# Patient Record
Sex: FEMALE | URBAN - METROPOLITAN AREA
[De-identification: names, ages, dates, MRNs, and addresses within clinical notes are randomized per-mention and may not be internally consistent; named-entity substitution may affect disease eponyms.]

---

## 2020-06-05 ENCOUNTER — TELEPHONE ENCOUNTER (OUTPATIENT)
Dept: URBAN - METROPOLITAN AREA CLINIC 92 | Facility: CLINIC | Age: 55
End: 2020-06-05

## 2020-07-07 ENCOUNTER — TELEPHONE ENCOUNTER (OUTPATIENT)
Dept: URBAN - METROPOLITAN AREA CLINIC 92 | Facility: CLINIC | Age: 55
End: 2020-07-07

## 2020-08-03 ENCOUNTER — OFFICE VISIT (OUTPATIENT)
Dept: URBAN - METROPOLITAN AREA MEDICAL CENTER 28 | Facility: MEDICAL CENTER | Age: 55
End: 2020-08-03
Payer: COMMERCIAL

## 2020-08-03 ENCOUNTER — LAB OUTSIDE AN ENCOUNTER (OUTPATIENT)
Dept: URBAN - METROPOLITAN AREA CLINIC 92 | Facility: CLINIC | Age: 55
End: 2020-08-03

## 2020-08-03 DIAGNOSIS — D13.2 ADENOMA OF DUODENUM: ICD-10-CM

## 2020-08-03 DIAGNOSIS — K31.89 ACQUIRED DEFORMITY OF PYLORUS: ICD-10-CM

## 2020-08-03 LAB
ABSOLUTE NRBC COUNT: 0
HCT: 38.4
HGB: 12
MCH: 28.4
MCHC: 31.3
MCV: 91
MPV: 10.8
NRBC AUTO: 0
PERFORMING LAB: (no result)
PLATELETS: 244
RBC: 4.22
RDW: 13.3
WBC: 7.8

## 2020-08-03 PROCEDURE — 43254 EGD ENDO MUCOSAL RESECTION: CPT | Performed by: INTERNAL MEDICINE

## 2020-08-03 RX ORDER — DICYCLOMINE HYDROCHLORIDE 20 MG/1
TAKE 2 TABLETS (40 MG) BY ORAL ROUTE 3 TIMES PER DAY TABLET ORAL
Qty: 0 | Refills: 0 | Status: ACTIVE | COMMUNITY
Start: 1900-01-01 | End: 1900-01-01

## 2020-08-03 RX ORDER — ALBUTEROL SULFATE 90 UG/1
AEROSOL, METERED RESPIRATORY (INHALATION)
Qty: 0 | Refills: 0 | Status: ACTIVE | COMMUNITY
Start: 1900-01-01 | End: 1900-01-01

## 2020-08-03 RX ORDER — LISINOPRIL 10 MG/1
TAKE 1 TABLET (10 MG) BY ORAL ROUTE ONCE DAILY TABLET ORAL 1
Qty: 0 | Refills: 0 | Status: ACTIVE | COMMUNITY
Start: 1900-01-01 | End: 1900-01-01

## 2020-08-03 RX ORDER — FLUTICASONE FUROATE AND VILANTEROL TRIFENATATE 100; 25 UG/1; UG/1
INHALE 1 PUFF BY INHALATION ROUTE ONCE DAILY AT THE SAME TIME EACH DAY POWDER RESPIRATORY (INHALATION) 1
Qty: 1 | Refills: 0 | Status: ACTIVE | COMMUNITY
Start: 1900-01-01 | End: 1900-01-01

## 2020-08-03 RX ORDER — OMEPRAZOLE 40 MG/1
TAKE 1 CAPSULE (40 MG) BY ORAL ROUTE ONCE DAILY BEFORE A MEAL CAPSULE, DELAYED RELEASE PELLETS ORAL 1
Qty: 0 | Refills: 0 | Status: ACTIVE | COMMUNITY
Start: 1900-01-01 | End: 1900-01-01

## 2020-08-03 RX ORDER — SERTRALINE 100 MG/1
TAKE 1 TABLET (100 MG) BY ORAL ROUTE ONCE DAILY TABLET, FILM COATED ORAL 1
Qty: 0 | Refills: 0 | Status: ACTIVE | COMMUNITY
Start: 1900-01-01 | End: 1900-01-01

## 2020-08-03 RX ORDER — CETIRIZINE HYDROCHLORIDE 10 MG/1
TABLET, FILM COATED ORAL
Qty: 0 | Refills: 0 | Status: ACTIVE | COMMUNITY
Start: 1900-01-01 | End: 1900-01-01

## 2020-08-04 ENCOUNTER — OUT OF OFFICE VISIT (OUTPATIENT)
Dept: URBAN - METROPOLITAN AREA MEDICAL CENTER 28 | Facility: MEDICAL CENTER | Age: 55
End: 2020-08-04
Payer: COMMERCIAL

## 2020-08-04 DIAGNOSIS — D13.2 ADENOMA OF DUODENUM: ICD-10-CM

## 2020-08-04 PROCEDURE — 99232 SBSQ HOSP IP/OBS MODERATE 35: CPT | Performed by: INTERNAL MEDICINE

## 2020-08-06 ENCOUNTER — LAB OUTSIDE AN ENCOUNTER (OUTPATIENT)
Dept: URBAN - METROPOLITAN AREA CLINIC 92 | Facility: CLINIC | Age: 55
End: 2020-08-06

## 2020-10-14 ENCOUNTER — TELEPHONE ENCOUNTER (OUTPATIENT)
Dept: URBAN - METROPOLITAN AREA CLINIC 92 | Facility: CLINIC | Age: 55
End: 2020-10-14

## 2020-11-09 ENCOUNTER — TELEPHONE ENCOUNTER (OUTPATIENT)
Dept: URBAN - METROPOLITAN AREA CLINIC 92 | Facility: CLINIC | Age: 55
End: 2020-11-09

## 2020-11-23 ENCOUNTER — OFFICE VISIT (OUTPATIENT)
Dept: URBAN - METROPOLITAN AREA MEDICAL CENTER 28 | Facility: MEDICAL CENTER | Age: 55
End: 2020-11-23
Payer: COMMERCIAL

## 2020-11-23 DIAGNOSIS — D13.2 ADENOMA OF DUODENUM: ICD-10-CM

## 2020-11-23 DIAGNOSIS — K29.80 ACUTE DUODENITIS: ICD-10-CM

## 2020-11-23 PROCEDURE — 43239 EGD BIOPSY SINGLE/MULTIPLE: CPT | Performed by: INTERNAL MEDICINE

## 2020-12-02 ENCOUNTER — TELEPHONE ENCOUNTER (OUTPATIENT)
Dept: URBAN - METROPOLITAN AREA CLINIC 92 | Facility: CLINIC | Age: 55
End: 2020-12-02

## 2020-12-03 ENCOUNTER — TELEPHONE ENCOUNTER (OUTPATIENT)
Dept: URBAN - METROPOLITAN AREA CLINIC 92 | Facility: CLINIC | Age: 55
End: 2020-12-03

## 2020-12-15 ENCOUNTER — TELEPHONE ENCOUNTER (OUTPATIENT)
Dept: URBAN - METROPOLITAN AREA CLINIC 92 | Facility: CLINIC | Age: 55
End: 2020-12-15

## 2020-12-15 ENCOUNTER — OFFICE VISIT (OUTPATIENT)
Dept: URBAN - METROPOLITAN AREA TELEHEALTH 2 | Facility: TELEHEALTH | Age: 55
End: 2020-12-15
Payer: COMMERCIAL

## 2020-12-15 DIAGNOSIS — R10.11 RUQ PAIN: ICD-10-CM

## 2020-12-15 DIAGNOSIS — K21.00 GASTROESOPHAGEAL REFLUX DISEASE WITH ESOPHAGITIS WITHOUT HEMORRHAGE: ICD-10-CM

## 2020-12-15 DIAGNOSIS — K44.9 HIATAL HERNIA: ICD-10-CM

## 2020-12-15 DIAGNOSIS — K57.50 DIVERTICULOSIS OF BOTH SMALL AND LARGE INTESTINE: ICD-10-CM

## 2020-12-15 PROCEDURE — G8427 DOCREV CUR MEDS BY ELIG CLIN: HCPCS | Performed by: INTERNAL MEDICINE

## 2020-12-15 PROCEDURE — 1036F TOBACCO NON-USER: CPT | Performed by: INTERNAL MEDICINE

## 2020-12-15 PROCEDURE — G8417 CALC BMI ABV UP PARAM F/U: HCPCS | Performed by: INTERNAL MEDICINE

## 2020-12-15 PROCEDURE — 99214 OFFICE O/P EST MOD 30 MIN: CPT | Performed by: INTERNAL MEDICINE

## 2020-12-15 PROCEDURE — G9903 PT SCRN TBCO ID AS NON USER: HCPCS | Performed by: INTERNAL MEDICINE

## 2020-12-15 PROCEDURE — G8482 FLU IMMUNIZE ORDER/ADMIN: HCPCS | Performed by: INTERNAL MEDICINE

## 2020-12-15 RX ORDER — SERTRALINE 100 MG/1
TAKE 1 TABLET (100 MG) BY ORAL ROUTE ONCE DAILY TABLET, FILM COATED ORAL 1
Qty: 0 | Refills: 0 | COMMUNITY
Start: 1900-01-01

## 2020-12-15 RX ORDER — FLUTICASONE FUROATE AND VILANTEROL TRIFENATATE 100; 25 UG/1; UG/1
INHALE 1 PUFF BY INHALATION ROUTE ONCE DAILY AT THE SAME TIME EACH DAY POWDER RESPIRATORY (INHALATION) 1
Qty: 1 | Refills: 0 | COMMUNITY
Start: 1900-01-01

## 2020-12-15 RX ORDER — LISINOPRIL 10 MG/1
TAKE 1 TABLET (10 MG) BY ORAL ROUTE ONCE DAILY TABLET ORAL 1
Qty: 0 | Refills: 0 | COMMUNITY
Start: 1900-01-01

## 2020-12-15 RX ORDER — OMEPRAZOLE 40 MG/1
TAKE 1 CAPSULE (40 MG) BY ORAL ROUTE ONCE DAILY BEFORE A MEAL CAPSULE, DELAYED RELEASE PELLETS ORAL 1
Qty: 0 | Refills: 0 | COMMUNITY
Start: 1900-01-01

## 2020-12-15 RX ORDER — DICYCLOMINE HYDROCHLORIDE 20 MG/1
TAKE 2 TABLETS (40 MG) BY ORAL ROUTE 3 TIMES PER DAY TABLET ORAL
Qty: 0 | Refills: 0 | COMMUNITY
Start: 1900-01-01

## 2020-12-15 RX ORDER — CETIRIZINE HYDROCHLORIDE 10 MG/1
TABLET, FILM COATED ORAL
Qty: 0 | Refills: 0 | COMMUNITY
Start: 1900-01-01

## 2020-12-15 RX ORDER — ALBUTEROL SULFATE 90 UG/1
AEROSOL, METERED RESPIRATORY (INHALATION)
Qty: 0 | Refills: 0 | COMMUNITY
Start: 1900-01-01

## 2020-12-15 RX ORDER — OMEPRAZOLE 40 MG/1
1 CAPSULE 30 MINUTES BEFORE MORNING MEAL CAPSULE, DELAYED RELEASE ORAL ONCE A DAY
Qty: 90 | Refills: 3 | OUTPATIENT
Start: 2020-12-15

## 2020-12-15 RX ORDER — HYOSCYAMINE SULFATE 0.12 MG/1
1 TABLET AS NEEDED TABLET ORAL NIGHTLY
Qty: 90 | Refills: 3 | OUTPATIENT
Start: 2020-12-15 | End: 2021-12-10

## 2020-12-15 NOTE — HPI-TODAY'S VISIT:
This is a 55yoF who presents for f/u after EGD. She was initially referred early this year for evaluation of abnormal EGD.      She was having chronic RUQ abdominal pain for 1.5 years that was characterized as intermittent pain that lasted up 2 weeks in duration.  The symptoms were exacerbated with eating without any alleviating factors.  At onset she had  intermittent nausea and vomiting but now reports the pain comes less often and no N/V recently.   She had reflux symptoms but currently on Prilosec 40 mg daily before dinner with good control of reflux symptoms.   She she denies any dysphagia,  fever, chills, or constitutional symptoms including unintentional weight loss.   She underwent EGD and colonoscopy on 03/10/2020 that showed a 3 cm hiatal hernia and it nonerosive gastritis.  Random biopsy of the esophagus showed reflux active changes.  Biopsy of the stomach showed gastritis but is not clear if she has H pylori gastritis.  There was a focal thickening in the 2nd portion duodenum with irregularity which the biopsy revealed tubular adenoma without dysplasia.  Colonoscopy showed diverticulosis in the transverse and sigmoid colon.  There was a 7 mm inflammatory polyp in the transverse colon and a 5 mm hyperplastic polyp in the rectum. Internal hemorrhoids were also noted.   She underwent EGD/EUS on 05/18/2020 that demonstrated a 2 cm hiatal hernia and antral reactive gastropathy without H pylori.  There were fundic gland polyps as well as hyperplastic polyps.  There was a 2.2 cm sessile polyp in the 2nd portion duodenum distal to the ampulla with a carpet-like extension.  There was also a nodular mucosa adjacent to the papilla which the biopsy showed prominent Brunner glands. Diffuse echogenicity of pancreas also noted.   EGD 8/3/2020 2cm HH, gastritis and 25mm adenoma in duodenum--resection incomplete s/p surgery EGD 11/23/2020 cm HH, gastritis, patent anastamosis in 2nd part duodenum, 2mm duodenal polyp, benign polyp but anastamosis with adenomatous changes. Rec EGD in 6m  She called last week as had one episode of rectal bleeding last week both in the toilet bowl and on the toilet paper. No bleeding since that time and has BM daily, some constipation and some diarrhea. She is using levsin at night for abd cramps given by PCP in [facility].

## 2020-12-18 ENCOUNTER — OFFICE VISIT (OUTPATIENT)
Dept: URBAN - METROPOLITAN AREA CLINIC 91 | Facility: CLINIC | Age: 55
End: 2020-12-18

## 2022-06-03 ENCOUNTER — OFFICE VISIT (OUTPATIENT)
Dept: URBAN - METROPOLITAN AREA CLINIC 92 | Facility: CLINIC | Age: 57
End: 2022-06-03
Payer: COMMERCIAL

## 2022-06-03 VITALS
TEMPERATURE: 97.3 F | BODY MASS INDEX: 30.13 KG/M2 | SYSTOLIC BLOOD PRESSURE: 139 MMHG | DIASTOLIC BLOOD PRESSURE: 80 MMHG | HEIGHT: 67 IN | HEART RATE: 89 BPM | WEIGHT: 192 LBS

## 2022-06-03 DIAGNOSIS — Z86.010 PERSONAL HISTORY OF COLONIC POLYPS: ICD-10-CM

## 2022-06-03 DIAGNOSIS — K57.90 DIVERTICULOSIS: ICD-10-CM

## 2022-06-03 DIAGNOSIS — K21.00 GASTROESOPHAGEAL REFLUX DISEASE WITH ESOPHAGITIS WITHOUT HEMORRHAGE: ICD-10-CM

## 2022-06-03 DIAGNOSIS — D13.2 DUODENAL ADENOMA: ICD-10-CM

## 2022-06-03 PROCEDURE — 99214 OFFICE O/P EST MOD 30 MIN: CPT | Performed by: INTERNAL MEDICINE

## 2022-06-03 RX ORDER — LISINOPRIL 10 MG/1
TAKE 1 TABLET (10 MG) BY ORAL ROUTE ONCE DAILY TABLET ORAL 1
Qty: 0 | Refills: 0 | Status: ACTIVE | COMMUNITY
Start: 1900-01-01

## 2022-06-03 RX ORDER — POLYETHYLENE GLYCOL 3350, SODIUM SULFATE ANHYDROUS, SODIUM BICARBONATE, SODIUM CHLORIDE, POTASSIUM CHLORIDE 236; 22.74; 6.74; 5.86; 2.97 G/4L; G/4L; G/4L; G/4L; G/4L
AS DIRECTED POWDER, FOR SOLUTION ORAL
Qty: 236 GRAM | Refills: 0 | OUTPATIENT
Start: 2022-06-03 | End: 2022-06-04

## 2022-06-03 RX ORDER — DICYCLOMINE HYDROCHLORIDE 20 MG/1
TAKE 2 TABLETS (40 MG) BY ORAL ROUTE 3 TIMES PER DAY TABLET ORAL
Qty: 0 | Refills: 0 | Status: ACTIVE | COMMUNITY
Start: 1900-01-01

## 2022-06-03 RX ORDER — SERTRALINE 100 MG/1
TAKE 1 TABLET (100 MG) BY ORAL ROUTE ONCE DAILY TABLET, FILM COATED ORAL 1
Qty: 0 | Refills: 0 | Status: ACTIVE | COMMUNITY
Start: 1900-01-01

## 2022-06-03 RX ORDER — OMEPRAZOLE 40 MG/1
1 CAPSULE 30 MINUTES BEFORE MORNING MEAL CAPSULE, DELAYED RELEASE ORAL ONCE A DAY
Qty: 90 | Refills: 3 | Status: ACTIVE | COMMUNITY
Start: 2020-12-15

## 2022-06-03 RX ORDER — FLUTICASONE FUROATE AND VILANTEROL TRIFENATATE 100; 25 UG/1; UG/1
INHALE 1 PUFF BY INHALATION ROUTE ONCE DAILY AT THE SAME TIME EACH DAY POWDER RESPIRATORY (INHALATION) 1
Qty: 1 | Refills: 0 | Status: ACTIVE | COMMUNITY
Start: 1900-01-01

## 2022-06-03 RX ORDER — OMEPRAZOLE 40 MG/1
TAKE 1 CAPSULE (40 MG) BY ORAL ROUTE ONCE DAILY BEFORE A MEAL CAPSULE, DELAYED RELEASE PELLETS ORAL 1
Qty: 0 | Refills: 0 | Status: ACTIVE | COMMUNITY
Start: 1900-01-01

## 2022-06-03 RX ORDER — OMEPRAZOLE 40 MG/1
1 CAPSULE 30 MINUTES BEFORE MORNING MEAL CAPSULE, DELAYED RELEASE ORAL ONCE A DAY
Qty: 90 | Refills: 3 | OUTPATIENT

## 2022-06-03 NOTE — HPI-TODAY'S VISIT:
This is a 55-year-old female who now presents for follow-up.    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  She underwent EGD and colonoscopy by Dr. Stephanie Moralez on 03/10/2020 that demonstrated a 3 cm hiatal hernia and non-erosive gastritis.  A random biopsy of the esophagus showed reflux changes.  Biopsies demonstrated gastritis with possible H.pylori gastritis.  There was focal thickening in the 2nd portion of the duodenum with irregularity which biopsies revealed tubular adenoma without dysplasia.  Colonoscopy revealed diverticulosis in the transverse and sigmoid colon.  There was a 7 mm inflammatory polyp and 5 mm hyperplastic polyp in the rectum as well as internal hemorrhoids.    She underwent  EGD/EUS on 05/18/2020 that again demonstrated a 2 cm hiatal hernia and reactive gastropathy without H pylori.  There were fundic gland polyps and hyperplastic polyps.  There was a 2.2 cm sessile polyp in the 2nd portion of the duodenum distal to the ampulla with carpet like extension.  There was a nodular mucosa adjacent to the papilla which biopsies revealed prominent Brunner's gland.  Diffuse echogenicity of the pancreas was also noted.  She had a repeat procedure on 08/03/2020 and had endoscopic mucosal resection of a 2.5 cm adenoma which the resection was incomplete.  She underwent surgical resection by Dr. Josué Melendez.  She had repeat procedure on 11/23/2020 that showed a patent anastomosis in the 2nd portion duodenum with residual 2 mm duodenal polyp at the anastomosis and the biopsies showed adenomatous changes.  She was recommended to undergo repeat procedure in 6 months but failed to do so.    Patient reports having intermittent reflux symptoms without any odynophagia or dysphagia.  There is no abdominal pain, N/V, hematemesis, melena but reports having frequent BRBPR for the past 1 year.

## 2022-07-21 ENCOUNTER — WEB ENCOUNTER (OUTPATIENT)
Dept: URBAN - METROPOLITAN AREA SURGERY CENTER 16 | Facility: SURGERY CENTER | Age: 57
End: 2022-07-21

## 2022-07-21 ENCOUNTER — OFFICE VISIT (OUTPATIENT)
Dept: URBAN - METROPOLITAN AREA SURGERY CENTER 16 | Facility: SURGERY CENTER | Age: 57
End: 2022-07-21
Payer: COMMERCIAL

## 2022-07-21 DIAGNOSIS — K29.60 ADENOPAPILLOMATOSIS GASTRICA: ICD-10-CM

## 2022-07-21 DIAGNOSIS — Z86.010 ADENOMAS PERSONAL HISTORY OF COLONIC POLYPS: ICD-10-CM

## 2022-07-21 DIAGNOSIS — D13.2 ADENOMA OF DUODENUM: ICD-10-CM

## 2022-07-21 DIAGNOSIS — K62.1 ANAL AND RECTAL POLYP: ICD-10-CM

## 2022-07-21 DIAGNOSIS — K63.5 BENIGN COLON POLYP: ICD-10-CM

## 2022-07-21 PROCEDURE — G8907 PT DOC NO EVENTS ON DISCHARG: HCPCS | Performed by: INTERNAL MEDICINE

## 2022-07-21 PROCEDURE — 43251 EGD REMOVE LESION SNARE: CPT | Performed by: INTERNAL MEDICINE

## 2022-07-21 PROCEDURE — 43239 EGD BIOPSY SINGLE/MULTIPLE: CPT | Performed by: INTERNAL MEDICINE

## 2022-07-21 PROCEDURE — 45380 COLONOSCOPY AND BIOPSY: CPT | Performed by: INTERNAL MEDICINE

## 2022-07-21 RX ORDER — OMEPRAZOLE 40 MG/1
1 CAPSULE 30 MINUTES BEFORE MORNING MEAL CAPSULE, DELAYED RELEASE ORAL ONCE A DAY
Qty: 90 | Refills: 3 | Status: ACTIVE | COMMUNITY

## 2022-07-21 RX ORDER — SERTRALINE 100 MG/1
TAKE 1 TABLET (100 MG) BY ORAL ROUTE ONCE DAILY TABLET, FILM COATED ORAL 1
Qty: 0 | Refills: 0 | Status: ACTIVE | COMMUNITY
Start: 1900-01-01

## 2022-07-21 RX ORDER — LISINOPRIL 10 MG/1
TAKE 1 TABLET (10 MG) BY ORAL ROUTE ONCE DAILY TABLET ORAL 1
Qty: 0 | Refills: 0 | Status: ACTIVE | COMMUNITY
Start: 1900-01-01

## 2022-07-21 RX ORDER — OMEPRAZOLE 40 MG/1
TAKE 1 CAPSULE (40 MG) BY ORAL ROUTE ONCE DAILY BEFORE A MEAL CAPSULE, DELAYED RELEASE PELLETS ORAL 1
Qty: 0 | Refills: 0 | Status: ACTIVE | COMMUNITY
Start: 1900-01-01

## 2022-07-21 RX ORDER — DICYCLOMINE HYDROCHLORIDE 20 MG/1
TAKE 2 TABLETS (40 MG) BY ORAL ROUTE 3 TIMES PER DAY TABLET ORAL
Qty: 0 | Refills: 0 | Status: ACTIVE | COMMUNITY
Start: 1900-01-01

## 2022-07-21 RX ORDER — FLUTICASONE FUROATE AND VILANTEROL TRIFENATATE 100; 25 UG/1; UG/1
INHALE 1 PUFF BY INHALATION ROUTE ONCE DAILY AT THE SAME TIME EACH DAY POWDER RESPIRATORY (INHALATION) 1
Qty: 1 | Refills: 0 | Status: ACTIVE | COMMUNITY
Start: 1900-01-01

## 2022-09-16 ENCOUNTER — OFFICE VISIT (OUTPATIENT)
Dept: URBAN - METROPOLITAN AREA CLINIC 92 | Facility: CLINIC | Age: 57
End: 2022-09-16
Payer: COMMERCIAL

## 2022-09-16 ENCOUNTER — WEB ENCOUNTER (OUTPATIENT)
Dept: URBAN - METROPOLITAN AREA CLINIC 92 | Facility: CLINIC | Age: 57
End: 2022-09-16

## 2022-09-16 ENCOUNTER — DASHBOARD ENCOUNTERS (OUTPATIENT)
Age: 57
End: 2022-09-16

## 2022-09-16 VITALS
DIASTOLIC BLOOD PRESSURE: 78 MMHG | HEART RATE: 84 BPM | WEIGHT: 192 LBS | SYSTOLIC BLOOD PRESSURE: 132 MMHG | BODY MASS INDEX: 30.13 KG/M2 | TEMPERATURE: 97.9 F | HEIGHT: 67 IN

## 2022-09-16 DIAGNOSIS — R13.19 ESOPHAGEAL DYSPHAGIA: ICD-10-CM

## 2022-09-16 DIAGNOSIS — R19.5 DARK STOOLS: ICD-10-CM

## 2022-09-16 DIAGNOSIS — K21.00 GASTROESOPHAGEAL REFLUX DISEASE WITH ESOPHAGITIS WITHOUT HEMORRHAGE: ICD-10-CM

## 2022-09-16 DIAGNOSIS — D13.2 DUODENAL ADENOMA: ICD-10-CM

## 2022-09-16 DIAGNOSIS — R14.0 BLOATING: ICD-10-CM

## 2022-09-16 DIAGNOSIS — Z86.010 PERSONAL HISTORY OF COLONIC POLYPS: ICD-10-CM

## 2022-09-16 DIAGNOSIS — K64.8 INTERNAL HEMORRHOID: ICD-10-CM

## 2022-09-16 DIAGNOSIS — K57.90 DIVERTICULOSIS: ICD-10-CM

## 2022-09-16 PROBLEM — 428283002: Status: ACTIVE | Noted: 2020-12-14

## 2022-09-16 PROBLEM — 397881000: Status: ACTIVE | Noted: 2020-12-14

## 2022-09-16 PROCEDURE — 99214 OFFICE O/P EST MOD 30 MIN: CPT

## 2022-09-16 RX ORDER — OMEPRAZOLE 40 MG/1
TAKE 1 CAPSULE (40 MG) BY ORAL ROUTE ONCE DAILY BEFORE A MEAL CAPSULE, DELAYED RELEASE PELLETS ORAL 1
Qty: 0 | Refills: 0 | Status: ACTIVE | COMMUNITY
Start: 1900-01-01

## 2022-09-16 RX ORDER — FLUTICASONE FUROATE AND VILANTEROL TRIFENATATE 100; 25 UG/1; UG/1
INHALE 1 PUFF BY INHALATION ROUTE ONCE DAILY AT THE SAME TIME EACH DAY POWDER RESPIRATORY (INHALATION) 1
Qty: 1 | Refills: 0 | Status: ACTIVE | COMMUNITY
Start: 1900-01-01

## 2022-09-16 RX ORDER — DICYCLOMINE HYDROCHLORIDE 20 MG/1
TAKE 2 TABLETS (40 MG) BY ORAL ROUTE 3 TIMES PER DAY TABLET ORAL
Qty: 0 | Refills: 0 | Status: ACTIVE | COMMUNITY
Start: 1900-01-01

## 2022-09-16 RX ORDER — LISINOPRIL 10 MG/1
TAKE 1 TABLET (10 MG) BY ORAL ROUTE ONCE DAILY TABLET ORAL 1
Qty: 0 | Refills: 0 | Status: ACTIVE | COMMUNITY
Start: 1900-01-01

## 2022-09-16 RX ORDER — OMEPRAZOLE 40 MG/1
1 CAPSULE 30 MINUTES BEFORE MORNING MEAL CAPSULE, DELAYED RELEASE ORAL ONCE A DAY
Qty: 90 | Refills: 3 | Status: ACTIVE | COMMUNITY

## 2022-09-16 RX ORDER — FAMOTIDINE 40 MG/1
1 TABLET AT BEDTIME TABLET, FILM COATED ORAL ONCE A DAY
Qty: 90 TABLET | Refills: 1 | OUTPATIENT
Start: 2022-09-16

## 2022-09-16 RX ORDER — PANTOPRAZOLE SODIUM 40 MG/1
1 TABLET TABLET, DELAYED RELEASE ORAL ONCE A DAY
Qty: 90 TABLET | Refills: 1 | OUTPATIENT
Start: 2022-09-16

## 2022-09-16 RX ORDER — SERTRALINE 100 MG/1
TAKE 1 TABLET (100 MG) BY ORAL ROUTE ONCE DAILY TABLET, FILM COATED ORAL 1
Qty: 0 | Refills: 0 | Status: ACTIVE | COMMUNITY
Start: 1900-01-01

## 2022-09-16 NOTE — HPI-TODAY'S VISIT:
This is a 55-year-old female who now presents for follow-up for her EGD and colonoscopy   She was seen by Dr. 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  She underwent EGD and colonoscopy by Dr. Stephanie Moralez on 03/10/2020 that demonstrated a 3 cm hiatal hernia and non-erosive gastritis.  A random biopsy of the esophagus showed reflux changes.  Biopsies demonstrated gastritis with possible H.pylori gastritis.  There was focal thickening in the 2nd portion of the duodenum with irregularity which biopsies revealed tubular adenoma without dysplasia.  Colonoscopy revealed diverticulosis in the transverse and sigmoid colon.  There was a 7 mm inflammatory polyp and 5 mm hyperplastic polyp in the rectum as well as internal hemorrhoids.    She underwent  EGD/EUS on 05/18/2020 that again demonstrated a 2 cm hiatal hernia and reactive gastropathy without H pylori.  There were fundic gland polyps and hyperplastic polyps.  There was a 2.2 cm sessile polyp in the 2nd portion of the duodenum distal to the ampulla with carpet like extension.  There was a nodular mucosa adjacent to the papilla which biopsies revealed prominent Brunner's gland.  Diffuse echogenicity of the pancreas was also noted.  She had a repeat procedure on 08/03/2020 and had endoscopic mucosal resection of a 2.5 cm adenoma which the resection was incomplete.  She underwent surgical resection by Dr. Josué Melendez.  She had repeat procedure on 11/23/2020 that showed a patent anastomosis in the 2nd portion duodenum with residual 2 mm duodenal polyp at the anastomosis and the biopsies showed adenomatous changes.  She was recommended to undergo repeat procedure in 6 months but failed to do so.    Patient reports having intermittent reflux symptoms without any odynophagia or dysphagia.  There is no abdominal pain, N/V, hematemesis, melena but reports having frequent BRBPR for the past 1 year.  9/15/22 Colonoscopy 7/21/22: Non bleeding internal hemorrhoids, Diverticulosis, 4 Hyperplastic polyps, repeat colonoscopy in 5 years.  EGD 7/21/22: 2cm HH, mild chronic gastritis-no h pylori , two 4 to 12mm sessile duodenal polyps-tubular adenomas. Repeat in 6 months  Currently states she feels as though she is choking on dry foods and certain drinks which has been present for several months.  She is on omeprazole 40 mg in the morning but still states she is having regurgitation and heartburn.  She does take Pepcid and Tums at night as needed.  She denies N/V, odynophagia, NSAID use.  She states for several months prior to colonoscopy she feels like her bowel habits have changed.  She typically has diarrhea and upset stomach which she has had for years.  But states ever since 4 to 5 months ago she feels as though she will only have small amount of bowel movements which are long and stringy. She has been using milk of magnesia which helps some. She has noticed intermittent blood and was shown to have internal hemorrhoids with her last colonoscopy.  She also notes darker stools but states she does not think that they have been black.  Denies recent changes in medications. She does have a history of lower abdominal pain that is sharp that is better with having a bowel movement.  She also notes bloating with diffrent foods which has also always been present. She denies family h/o celiac disease and she has never been tested.  States she was having some flank pain in the past few months and she has a h/o kidney stone. Her urologist obtained a CT scan which showed a small kidney stone and no other abnormalities. She by the urologist when he viewed the image there was stool in her rectum which was compressing her bladder and causing her to have increased urination.  I viewed CT results today and this was not noted by radiologist.  Per urologists notes he believes there is some component of bladder dysfunction

## 2022-09-19 LAB
ABSOLUTE BASOPHILS: 58
ABSOLUTE EOSINOPHILS: 248
ABSOLUTE LYMPHOCYTES: 2876
ABSOLUTE MONOCYTES: 511
ABSOLUTE NEUTROPHILS: 3606
BASOPHILS: 0.8
EOSINOPHILS: 3.4
HEMATOCRIT: 40.9
HEMOGLOBIN: 13.5
IMMUNOGLOBULIN A, QN, SERUM: 186
INTERPRETATION: (no result)
IRON BIND.CAP.(TIBC): 445
IRON SATURATION: 18
IRON: 81
LYMPHOCYTES: 39.4
MCH: 27.4
MCHC: 33
MCV: 83.1
MONOCYTES: 7
MPV: 10.8
NEUTROPHILS: 49.4
PLATELET COUNT: 257
RDW: 12.9
RED BLOOD CELL COUNT: 4.92
T-TRANSGLUTAMINASE (TTG) IGA: <1
WHITE BLOOD CELL COUNT: 7.3

## 2024-05-29 ENCOUNTER — TELEPHONE ENCOUNTER (OUTPATIENT)
Dept: URBAN - METROPOLITAN AREA CLINIC 92 | Facility: CLINIC | Age: 59
End: 2024-05-29

## 2024-06-03 ENCOUNTER — OFFICE VISIT (OUTPATIENT)
Dept: URBAN - METROPOLITAN AREA CLINIC 92 | Facility: CLINIC | Age: 59
End: 2024-06-03
Payer: COMMERCIAL

## 2024-06-03 ENCOUNTER — LAB OUTSIDE AN ENCOUNTER (OUTPATIENT)
Dept: URBAN - METROPOLITAN AREA CLINIC 92 | Facility: CLINIC | Age: 59
End: 2024-06-03

## 2024-06-03 VITALS
SYSTOLIC BLOOD PRESSURE: 133 MMHG | HEART RATE: 84 BPM | BODY MASS INDEX: 31.39 KG/M2 | TEMPERATURE: 97.7 F | HEIGHT: 67 IN | DIASTOLIC BLOOD PRESSURE: 86 MMHG | WEIGHT: 200 LBS

## 2024-06-03 DIAGNOSIS — Z86.010 PERSONAL HISTORY OF COLONIC POLYPS: ICD-10-CM

## 2024-06-03 DIAGNOSIS — K21.00 GASTROESOPHAGEAL REFLUX DISEASE WITH ESOPHAGITIS WITHOUT HEMORRHAGE: ICD-10-CM

## 2024-06-03 DIAGNOSIS — M62.89 PELVIC FLOOR DYSFUNCTION: ICD-10-CM

## 2024-06-03 DIAGNOSIS — D13.2 DUODENAL ADENOMA: ICD-10-CM

## 2024-06-03 DIAGNOSIS — K64.8 INTERNAL HEMORRHOID: ICD-10-CM

## 2024-06-03 DIAGNOSIS — K57.90 DIVERTICULOSIS: ICD-10-CM

## 2024-06-03 DIAGNOSIS — K59.04 CHRONIC IDIOPATHIC CONSTIPATION: ICD-10-CM

## 2024-06-03 DIAGNOSIS — R13.19 ESOPHAGEAL DYSPHAGIA: ICD-10-CM

## 2024-06-03 PROBLEM — 82934008: Status: ACTIVE | Noted: 2024-06-03

## 2024-06-03 PROCEDURE — 99214 OFFICE O/P EST MOD 30 MIN: CPT

## 2024-06-03 RX ORDER — SERTRALINE 100 MG/1
TAKE 1 TABLET (100 MG) BY ORAL ROUTE ONCE DAILY TABLET, FILM COATED ORAL 1
Qty: 0 | Refills: 0 | Status: ACTIVE | COMMUNITY
Start: 1900-01-01

## 2024-06-03 RX ORDER — HYDROCORTISONE ACETATE 25 MG/1
1 SUPPOSITORY SUPPOSITORY RECTAL TWICE A DAY
Qty: 28 | Refills: 0 | OUTPATIENT
Start: 2024-06-03 | End: 2024-06-17

## 2024-06-03 RX ORDER — FAMOTIDINE 40 MG/1
1 TABLET AT BEDTIME TABLET, FILM COATED ORAL ONCE A DAY
Qty: 90 TABLET | Refills: 1 | Status: DISCONTINUED | COMMUNITY
Start: 2022-09-16

## 2024-06-03 RX ORDER — PANTOPRAZOLE SODIUM 40 MG/1
1 TABLET TABLET, DELAYED RELEASE ORAL ONCE A DAY
Qty: 90 TABLET | Refills: 1 | OUTPATIENT

## 2024-06-03 RX ORDER — FAMOTIDINE 40 MG/1
1 TABLET AT BEDTIME TABLET, FILM COATED ORAL ONCE A DAY
Qty: 90 TABLET | Refills: 1 | OUTPATIENT

## 2024-06-03 RX ORDER — DICYCLOMINE HYDROCHLORIDE 20 MG/1
TAKE 2 TABLETS (40 MG) BY ORAL ROUTE 3 TIMES PER DAY TABLET ORAL
Qty: 0 | Refills: 0 | Status: ACTIVE | COMMUNITY
Start: 1900-01-01

## 2024-06-03 RX ORDER — OMEPRAZOLE 40 MG/1
1 CAPSULE 30 MINUTES BEFORE MORNING MEAL CAPSULE, DELAYED RELEASE ORAL ONCE A DAY
Qty: 90 | Refills: 3 | Status: ACTIVE | COMMUNITY

## 2024-06-03 RX ORDER — LINACLOTIDE 72 UG/1
1 CAPSULE AT LEAST 30 MINUTES BEFORE THE FIRST MEAL OF THE DAY ON AN EMPTY STOMACH CAPSULE, GELATIN COATED ORAL ONCE A DAY
Qty: 90 | Refills: 3 | OUTPATIENT
Start: 2024-06-03 | End: 2025-05-29

## 2024-06-03 RX ORDER — PANTOPRAZOLE SODIUM 40 MG/1
1 TABLET TABLET, DELAYED RELEASE ORAL ONCE A DAY
Qty: 90 TABLET | Refills: 1 | Status: DISCONTINUED | COMMUNITY
Start: 2022-09-16

## 2024-06-03 RX ORDER — POLYETHYLENE GLYCOL 3350, SODIUM SULFATE ANHYDROUS, SODIUM BICARBONATE, SODIUM CHLORIDE, POTASSIUM CHLORIDE 236; 22.74; 6.74; 5.86; 2.97 G/4L; G/4L; G/4L; G/4L; G/4L
AS DIRECTED POWDER, FOR SOLUTION ORAL 1
Qty: 1 | OUTPATIENT
Start: 2024-06-03 | End: 2024-06-04

## 2024-06-03 RX ORDER — OMEPRAZOLE 40 MG/1
TAKE 1 CAPSULE (40 MG) BY ORAL ROUTE ONCE DAILY BEFORE A MEAL CAPSULE, DELAYED RELEASE PELLETS ORAL 1
Qty: 0 | Refills: 0 | Status: ACTIVE | COMMUNITY
Start: 1900-01-01

## 2024-06-03 RX ORDER — DULOXETINE 30 MG/1
1 CAPSULE CAPSULE, DELAYED RELEASE PELLETS ORAL ONCE A DAY
Status: ACTIVE | COMMUNITY

## 2024-06-03 RX ORDER — FLUTICASONE FUROATE AND VILANTEROL TRIFENATATE 100; 25 UG/1; UG/1
INHALE 1 PUFF BY INHALATION ROUTE ONCE DAILY AT THE SAME TIME EACH DAY POWDER RESPIRATORY (INHALATION) 1
Qty: 1 | Refills: 0 | Status: ACTIVE | COMMUNITY
Start: 1900-01-01

## 2024-06-03 RX ORDER — PANTOPRAZOLE SODIUM 40 MG/1
1 TABLET TABLET, DELAYED RELEASE ORAL ONCE A DAY
Status: ACTIVE | COMMUNITY

## 2024-06-03 RX ORDER — LISINOPRIL 10 MG/1
TAKE 1 TABLET (10 MG) BY ORAL ROUTE ONCE DAILY TABLET ORAL 1
Qty: 0 | Refills: 0 | Status: ACTIVE | COMMUNITY
Start: 1900-01-01

## 2024-06-03 NOTE — PHYSICAL EXAM GASTROINTESTINAL
Abdomen , soft, +periumbilical pain, nondistended , no guarding or rigidity , no masses palpable , normal bowel sounds , Liver and Spleen , no hepatomegaly present , no hepatosplenomegaly , liver nontender , spleen not palpable , Rectal perianal skin tags, no fissures or external hemorrhoids, normal sphincter tone, palpated internal hemorrhoids, no blood or melena appreciated in rectal vault, no masses

## 2024-06-03 NOTE — HPI-TODAY'S VISIT:
58-year-old female who now presents for follow-up. I last saw her 9/2022.   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  She underwent EGD 03/10/2020 that demonstrated a 3 cm HH and non-erosive gastritis.  Biopsy of the esophagus showed reflux changes.  Biopsies demonstrated gastritis with possible H.pylori gastritis.  There was focal thickening in the 2nd portion of the duodenum with irregularity which biopsies revealed tubular adenoma without dysplasia. She underwent  EGD/EUS on 05/18/2020 that again demonstrated a 2 cm HH and reactive gastropathy w/o HP, +HP and Fundic gland polyps. There was a 2.2 cm sessile polyp in the 2nd portion of the duodenum distal to the ampulla with carpet like extension.  There was a nodular mucosa adjacent to the papilla which biopsies revealed prominent Brunner's gland.  Diffuse echogenicity of the pancreas was also noted.  She had a repeat procedure on 08/03/2020 and had endoscopic mucosal resection of a 2.5 cm adenoma which the resection was incomplete.  She underwent surgical resection by Dr. Josué Melendez.  She had repeat procedure on 11/23/2020 that showed a patent anastomosis in the 2nd portion duodenum with residual 2 mm duodenal polyp at the anastomosis and the biopsies showed adenomatous changes. EGD 7/21/22: 2cm HH, mild chronic gastritis-no h pylori , two 4 to 12mm sessile duodenal polyps-tubular adenomas. Repeat in 6 months- She did not f/u with this. States GERD managed with current meds for now.  When I saw her she was c/o choking on dry food and certain drinks x several months. Was also taking omeprazole 40mg QAM but still had reflux. Barium swallow 9-2022 demonstrated a 12.5 mm barium tablet at the level of the distal thoracic esophagus and did not pass even with additional swallows and mild stricture cannot be excluded. She did not f/u after. Continues to complain of dysphagia where bread, meats typically get stuck in her mid-lower chest and then causes pain after sitting. Slowly it passes on its own without intervention. Typically occurs if she turns her head to the right when swallowing. Reports randomly this occurs. Currently denies choking or aspiration.  She was also c/o changes in BM and felt like she was having small long stringy BM. Was using MOM which helped some. She has noticed intermittent blood and was shown to have internal hemorrhoids with her last colonoscopy. She also noted darker stools but states she did not think that they have been black. After last visit we obtained labs that demonstrated normal CBC, iron and celiac. Colonoscopy 7/21/22: Non bleeding internal hemorrhoids, Diverticulosis, 4 Hyperplastic polyps, repeat colonoscopy in 5 years.  Currently reports occasional rectal bleeding, sometimes more than a few tablespoons in the toilet bowl but never blood clots. Always bright red.  States she was having some flank pain in the past few months and she has a h/o kidney stone. Her urologist obtained a CT scan which showed a small kidney stone and no other abnormalities. She by the urologist when he viewed the image there was stool in her rectum which was compressing her bladder and causing her to have increased urination.  I viewed CT results today and this was not noted by radiologist.  Per urologists notes he believes there is some component of bladder dysfunction.

## 2024-06-04 ENCOUNTER — TELEPHONE ENCOUNTER (OUTPATIENT)
Dept: URBAN - METROPOLITAN AREA CLINIC 92 | Facility: CLINIC | Age: 59
End: 2024-06-04

## 2024-07-26 ENCOUNTER — TELEPHONE ENCOUNTER (OUTPATIENT)
Dept: URBAN - METROPOLITAN AREA CLINIC 92 | Facility: CLINIC | Age: 59
End: 2024-07-26

## 2024-08-15 ENCOUNTER — OFFICE VISIT (OUTPATIENT)
Dept: URBAN - METROPOLITAN AREA SURGERY CENTER 16 | Facility: SURGERY CENTER | Age: 59
End: 2024-08-15
Payer: COMMERCIAL

## 2024-08-15 DIAGNOSIS — R13.19 CERVICAL DYSPHAGIA: ICD-10-CM

## 2024-08-15 DIAGNOSIS — D12.2 ADENOMA OF ASCENDING COLON: ICD-10-CM

## 2024-08-15 DIAGNOSIS — D13.2 ADENOMA OF DUODENUM: ICD-10-CM

## 2024-08-15 DIAGNOSIS — K63.5 BENIGN COLON POLYP: ICD-10-CM

## 2024-08-15 DIAGNOSIS — Z86.010 ADENOMAS PERSONAL HISTORY OF COLONIC POLYPS: ICD-10-CM

## 2024-08-15 PROCEDURE — 00813 ANES UPR LWR GI NDSC PX: CPT | Performed by: NURSE ANESTHETIST, CERTIFIED REGISTERED

## 2024-08-15 PROCEDURE — 43251 EGD REMOVE LESION SNARE: CPT | Performed by: INTERNAL MEDICINE

## 2024-08-15 PROCEDURE — 00813 ANES UPR LWR GI NDSC PX: CPT | Performed by: ANESTHESIOLOGY

## 2024-08-15 PROCEDURE — 45385 COLONOSCOPY W/LESION REMOVAL: CPT | Performed by: INTERNAL MEDICINE

## 2024-08-15 PROCEDURE — 45380 COLONOSCOPY AND BIOPSY: CPT | Performed by: INTERNAL MEDICINE

## 2024-08-15 PROCEDURE — 43239 EGD BIOPSY SINGLE/MULTIPLE: CPT | Performed by: INTERNAL MEDICINE

## 2024-08-15 RX ORDER — FLUTICASONE FUROATE AND VILANTEROL TRIFENATATE 100; 25 UG/1; UG/1
INHALE 1 PUFF BY INHALATION ROUTE ONCE DAILY AT THE SAME TIME EACH DAY POWDER RESPIRATORY (INHALATION) 1
Qty: 1 | Refills: 0 | Status: ACTIVE | COMMUNITY
Start: 1900-01-01

## 2024-08-15 RX ORDER — OMEPRAZOLE 40 MG/1
TAKE 1 CAPSULE (40 MG) BY ORAL ROUTE ONCE DAILY BEFORE A MEAL CAPSULE, DELAYED RELEASE PELLETS ORAL 1
Qty: 0 | Refills: 0 | Status: ACTIVE | COMMUNITY
Start: 1900-01-01

## 2024-08-15 RX ORDER — OMEPRAZOLE 40 MG/1
1 CAPSULE 30 MINUTES BEFORE MORNING MEAL CAPSULE, DELAYED RELEASE ORAL ONCE A DAY
Qty: 90 | Refills: 3 | Status: ACTIVE | COMMUNITY

## 2024-08-15 RX ORDER — DULOXETINE 30 MG/1
1 CAPSULE CAPSULE, DELAYED RELEASE PELLETS ORAL ONCE A DAY
Status: ACTIVE | COMMUNITY

## 2024-08-15 RX ORDER — FAMOTIDINE 40 MG/1
1 TABLET AT BEDTIME TABLET, FILM COATED ORAL ONCE A DAY
Qty: 90 TABLET | Refills: 1 | Status: ACTIVE | COMMUNITY

## 2024-08-15 RX ORDER — SERTRALINE 100 MG/1
TAKE 1 TABLET (100 MG) BY ORAL ROUTE ONCE DAILY TABLET, FILM COATED ORAL 1
Qty: 0 | Refills: 0 | Status: ACTIVE | COMMUNITY
Start: 1900-01-01

## 2024-08-15 RX ORDER — LISINOPRIL 10 MG/1
TAKE 1 TABLET (10 MG) BY ORAL ROUTE ONCE DAILY TABLET ORAL 1
Qty: 0 | Refills: 0 | Status: ACTIVE | COMMUNITY
Start: 1900-01-01

## 2024-08-15 RX ORDER — PANTOPRAZOLE SODIUM 40 MG/1
1 TABLET TABLET, DELAYED RELEASE ORAL ONCE A DAY
Qty: 90 TABLET | Refills: 1 | Status: ACTIVE | COMMUNITY

## 2024-08-15 RX ORDER — PANTOPRAZOLE SODIUM 40 MG/1
1 TABLET TABLET, DELAYED RELEASE ORAL ONCE A DAY
Status: ACTIVE | COMMUNITY

## 2024-08-15 RX ORDER — LINACLOTIDE 72 UG/1
1 CAPSULE AT LEAST 30 MINUTES BEFORE THE FIRST MEAL OF THE DAY ON AN EMPTY STOMACH CAPSULE, GELATIN COATED ORAL ONCE A DAY
Qty: 90 | Refills: 3 | Status: ACTIVE | COMMUNITY
Start: 2024-06-03 | End: 2025-05-29

## 2024-08-15 RX ORDER — DICYCLOMINE HYDROCHLORIDE 20 MG/1
TAKE 2 TABLETS (40 MG) BY ORAL ROUTE 3 TIMES PER DAY TABLET ORAL
Qty: 0 | Refills: 0 | Status: ACTIVE | COMMUNITY
Start: 1900-01-01

## 2024-08-16 ENCOUNTER — TELEPHONE ENCOUNTER (OUTPATIENT)
Dept: URBAN - METROPOLITAN AREA CLINIC 92 | Facility: CLINIC | Age: 59
End: 2024-08-16

## 2024-08-23 ENCOUNTER — TELEPHONE ENCOUNTER (OUTPATIENT)
Dept: URBAN - METROPOLITAN AREA CLINIC 92 | Facility: CLINIC | Age: 59
End: 2024-08-23

## 2024-08-27 ENCOUNTER — TELEPHONE ENCOUNTER (OUTPATIENT)
Dept: URBAN - METROPOLITAN AREA CLINIC 92 | Facility: CLINIC | Age: 59
End: 2024-08-27

## 2024-08-27 PROBLEM — 14760008: Status: ACTIVE | Noted: 2024-08-27

## 2024-08-27 RX ORDER — LINACLOTIDE 72 UG/1
1 CAPSULE AT LEAST 30 MINUTES BEFORE THE FIRST MEAL OF THE DAY ON AN EMPTY STOMACH CAPSULE, GELATIN COATED ORAL ONCE A DAY
Qty: 90 | Refills: 3 | OUTPATIENT
Start: 2024-08-27 | End: 2025-08-22

## 2024-09-06 ENCOUNTER — WEB ENCOUNTER (OUTPATIENT)
Dept: URBAN - METROPOLITAN AREA CLINIC 92 | Facility: CLINIC | Age: 59
End: 2024-09-06

## 2024-09-12 ENCOUNTER — OFFICE VISIT (OUTPATIENT)
Dept: URBAN - METROPOLITAN AREA CLINIC 92 | Facility: CLINIC | Age: 59
End: 2024-09-12
Payer: COMMERCIAL

## 2024-09-12 ENCOUNTER — TELEPHONE ENCOUNTER (OUTPATIENT)
Dept: URBAN - METROPOLITAN AREA CLINIC 92 | Facility: CLINIC | Age: 59
End: 2024-09-12

## 2024-09-12 VITALS
DIASTOLIC BLOOD PRESSURE: 82 MMHG | HEART RATE: 94 BPM | SYSTOLIC BLOOD PRESSURE: 144 MMHG | TEMPERATURE: 97 F | WEIGHT: 198 LBS | BODY MASS INDEX: 31.08 KG/M2 | HEIGHT: 67 IN

## 2024-09-12 DIAGNOSIS — K21.00 GASTROESOPHAGEAL REFLUX DISEASE WITH ESOPHAGITIS WITHOUT HEMORRHAGE: ICD-10-CM

## 2024-09-12 DIAGNOSIS — K62.5 RECTAL BLEEDING: ICD-10-CM

## 2024-09-12 DIAGNOSIS — R13.19 ESOPHAGEAL DYSPHAGIA: ICD-10-CM

## 2024-09-12 DIAGNOSIS — K59.04 CHRONIC IDIOPATHIC CONSTIPATION: ICD-10-CM

## 2024-09-12 PROCEDURE — 99214 OFFICE O/P EST MOD 30 MIN: CPT

## 2024-09-12 RX ORDER — OMEPRAZOLE 40 MG/1
TAKE 1 CAPSULE (40 MG) BY ORAL ROUTE ONCE DAILY BEFORE A MEAL CAPSULE, DELAYED RELEASE PELLETS ORAL 1
Qty: 0 | Refills: 0 | Status: ACTIVE | COMMUNITY
Start: 1900-01-01

## 2024-09-12 RX ORDER — LISINOPRIL 10 MG/1
TAKE 1 TABLET (10 MG) BY ORAL ROUTE ONCE DAILY TABLET ORAL 1
Qty: 0 | Refills: 0 | Status: ACTIVE | COMMUNITY
Start: 1900-01-01

## 2024-09-12 RX ORDER — PANTOPRAZOLE SODIUM 40 MG/1
1 TABLET TABLET, DELAYED RELEASE ORAL ONCE A DAY
Qty: 90 TABLET | Refills: 1 | Status: ACTIVE | COMMUNITY

## 2024-09-12 RX ORDER — SERTRALINE 100 MG/1
TAKE 1 TABLET (100 MG) BY ORAL ROUTE ONCE DAILY TABLET, FILM COATED ORAL 1
Qty: 0 | Refills: 0 | Status: ACTIVE | COMMUNITY
Start: 1900-01-01

## 2024-09-12 RX ORDER — PANTOPRAZOLE SODIUM 40 MG/1
1 TABLET TABLET, DELAYED RELEASE ORAL ONCE A DAY
Qty: 90 TABLET | Refills: 1 | OUTPATIENT

## 2024-09-12 RX ORDER — LINACLOTIDE 72 UG/1
1 CAPSULE AT LEAST 30 MINUTES BEFORE THE FIRST MEAL OF THE DAY ON AN EMPTY STOMACH CAPSULE, GELATIN COATED ORAL ONCE A DAY
Qty: 90 | Refills: 3 | Status: ACTIVE | COMMUNITY
Start: 2024-08-27 | End: 2025-08-22

## 2024-09-12 RX ORDER — FLUTICASONE FUROATE AND VILANTEROL TRIFENATATE 100; 25 UG/1; UG/1
INHALE 1 PUFF BY INHALATION ROUTE ONCE DAILY AT THE SAME TIME EACH DAY POWDER RESPIRATORY (INHALATION) 1
Qty: 1 | Refills: 0 | Status: ACTIVE | COMMUNITY
Start: 1900-01-01

## 2024-09-12 RX ORDER — FAMOTIDINE 40 MG/1
1 TABLET AT BEDTIME TABLET, FILM COATED ORAL ONCE A DAY
Qty: 90 TABLET | Refills: 1 | OUTPATIENT

## 2024-09-12 RX ORDER — DULOXETINE 30 MG/1
1 CAPSULE CAPSULE, DELAYED RELEASE PELLETS ORAL ONCE A DAY
Status: ACTIVE | COMMUNITY

## 2024-09-12 RX ORDER — OMEPRAZOLE 40 MG/1
1 CAPSULE 30 MINUTES BEFORE MORNING MEAL CAPSULE, DELAYED RELEASE ORAL ONCE A DAY
Qty: 90 | Refills: 3 | Status: ACTIVE | COMMUNITY

## 2024-09-12 RX ORDER — LINACLOTIDE 72 UG/1
1 CAPSULE AT LEAST 30 MINUTES BEFORE THE FIRST MEAL OF THE DAY ON AN EMPTY STOMACH CAPSULE, GELATIN COATED ORAL ONCE A DAY
Qty: 90 | Refills: 3 | Status: ACTIVE | COMMUNITY
Start: 2024-06-03 | End: 2025-05-29

## 2024-09-12 RX ORDER — FAMOTIDINE 40 MG/1
1 TABLET AT BEDTIME TABLET, FILM COATED ORAL ONCE A DAY
Qty: 90 TABLET | Refills: 1 | Status: ACTIVE | COMMUNITY

## 2024-09-12 RX ORDER — DICYCLOMINE HYDROCHLORIDE 20 MG/1
TAKE 2 TABLETS (40 MG) BY ORAL ROUTE 3 TIMES PER DAY TABLET ORAL
Qty: 0 | Refills: 0 | Status: ACTIVE | COMMUNITY
Start: 1900-01-01

## 2024-09-12 RX ORDER — LINACLOTIDE 72 UG/1
1 CAPSULE AT LEAST 30 MINUTES BEFORE THE FIRST MEAL OF THE DAY ON AN EMPTY STOMACH CAPSULE, GELATIN COATED ORAL ONCE A DAY
Qty: 90 | Refills: 3 | OUTPATIENT

## 2024-09-12 RX ORDER — PANTOPRAZOLE SODIUM 40 MG/1
1 TABLET TABLET, DELAYED RELEASE ORAL ONCE A DAY
Status: ACTIVE | COMMUNITY

## 2024-09-12 NOTE — HPI-TODAY'S VISIT:
59-year-old female who now presents for follow-up.   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  She underwent EGD 03/10/2020 that demonstrated a 3 cm HH and non-erosive gastritis.  Biopsy of the esophagus showed reflux changes.  Biopsies demonstrated gastritis with possible H.pylori gastritis.  There was focal thickening in the 2nd portion of the duodenum with irregularity which biopsies revealed tubular adenoma without dysplasia. She underwent  EGD/EUS on 05/18/2020 that again demonstrated a 2 cm HH and reactive gastropathy w/o HP, +HP and Fundic gland polyps. There was a 2.2 cm sessile polyp in the 2nd portion of the duodenum distal to the ampulla with carpet like extension.  There was a nodular mucosa adjacent to the papilla which biopsies revealed prominent Brunner's gland.  Diffuse echogenicity of the pancreas was also noted.  She had a repeat procedure on 08/03/2020 and had endoscopic mucosal resection of a 2.5 cm adenoma which the resection was incomplete.  She underwent surgical resection by Dr. Josué Melendez.  She had repeat procedure on 11/23/2020 that showed a patent anastomosis in the 2nd portion duodenum with residual 2 mm duodenal polyp at the anastomosis and the biopsies showed adenomatous changes. EGD 7/21/22: 2cm HH, mild chronic gastritis-no h pylori , two 4 to 12mm sessile duodenal polyps-tubular adenomas. Repeat in 6 months- She did not f/u with this. States GERD managed with current meds for now.  When I saw her she was c/o choking on dry food and certain drinks x several months. Was also taking omeprazole 40mg QAM but still had reflux. Barium swallow 9-2022 demonstrated a 12.5 mm barium tablet at the level of the distal thoracic esophagus and did not pass even with additional swallows and mild stricture cannot be excluded. She did not f/u after. Continues to complain of dysphagia where bread, meats typically get stuck in her mid-lower chest and then causes pain after sitting. Slowly it passes on its own without intervention. Typically occurs if she turns her head to the right when swallowing. Reports randomly this occurs. Currently denies choking or aspiration. EGD 8/15/24: single duodeal polyp bx showed duodenal adenoma with low grade dysphagsia, bx of esophagus was normal no EOE or IM. After her procedure she started to have abd pain n/v. CT was ordered which showed hepatomegaly ith fatty infiltration, 7.9 mm nonspecifid noncalicifed nodule in the left lower lobe f/u in 3 months, s/p hysterectomy. She will need a repeat EGD in 1-2 months with possible APC  Started on Linzess 72mcg which helped her BM. Still has rectal bleeding and has IH banding with Dr VENTURA next week. Feels some weakness. Colonoscopy 8/15/24: fair prep, IH and EH, diverticulosis in DC,AC and SC, 2 HP and 1 TA recall 3 yrs  States she was having some flank pain in the past few months and she has a h/o kidney stone. Her urologist obtained a CT scan which showed a small kidney stone and no other abnormalities. She by the urologist when he viewed the image there was stool in her rectum which was compressing her bladder and causing her to have increased urination.  I viewed CT results today and this was not noted by radiologist.  Per urologists notes he believes there is some component of bladder dysfunction.

## 2024-09-13 ENCOUNTER — TELEPHONE ENCOUNTER (OUTPATIENT)
Dept: URBAN - METROPOLITAN AREA CLINIC 92 | Facility: CLINIC | Age: 59
End: 2024-09-13

## 2024-09-13 LAB
ABSOLUTE BASOPHILS: 68
ABSOLUTE EOSINOPHILS: 150
ABSOLUTE LYMPHOCYTES: 2093
ABSOLUTE MONOCYTES: 540
ABSOLUTE NEUTROPHILS: 4650
BASOPHILS: 0.9
EOSINOPHILS: 2
FERRITIN, SERUM: 22
HEMATOCRIT: 44
HEMOGLOBIN: 14.7
IRON BIND.CAP.(TIBC): 422
IRON SATURATION: 18
IRON: 76
LYMPHOCYTES: 27.9
MCH: 30.4
MCHC: 33.4
MCV: 90.9
MONOCYTES: 7.2
MPV: 11.1
NEUTROPHILS: 62
PLATELET COUNT: 263
RDW: 13.5
RED BLOOD CELL COUNT: 4.84
WHITE BLOOD CELL COUNT: 7.5

## 2024-09-17 ENCOUNTER — OFFICE VISIT (OUTPATIENT)
Dept: URBAN - METROPOLITAN AREA CLINIC 92 | Facility: CLINIC | Age: 59
End: 2024-09-17
Payer: COMMERCIAL

## 2024-09-17 VITALS
TEMPERATURE: 97 F | DIASTOLIC BLOOD PRESSURE: 86 MMHG | HEIGHT: 67 IN | SYSTOLIC BLOOD PRESSURE: 148 MMHG | BODY MASS INDEX: 31.55 KG/M2 | WEIGHT: 201 LBS | HEART RATE: 79 BPM

## 2024-09-17 DIAGNOSIS — K64.1 GRADE II HEMORRHOIDS: ICD-10-CM

## 2024-09-17 PROCEDURE — 46221 LIGATION OF HEMORRHOID(S): CPT | Performed by: INTERNAL MEDICINE

## 2024-09-17 RX ORDER — PANTOPRAZOLE SODIUM 40 MG/1
1 TABLET TABLET, DELAYED RELEASE ORAL ONCE A DAY
Qty: 90 TABLET | Refills: 1 | Status: ACTIVE | COMMUNITY

## 2024-09-17 RX ORDER — FAMOTIDINE 40 MG/1
1 TABLET AT BEDTIME TABLET, FILM COATED ORAL ONCE A DAY
Qty: 90 TABLET | Refills: 1 | Status: ACTIVE | COMMUNITY

## 2024-09-17 RX ORDER — SERTRALINE 100 MG/1
TAKE 1 TABLET (100 MG) BY ORAL ROUTE ONCE DAILY TABLET, FILM COATED ORAL 1
Qty: 0 | Refills: 0 | COMMUNITY
Start: 1900-01-01

## 2024-09-17 RX ORDER — PANTOPRAZOLE SODIUM 40 MG/1
1 TABLET TABLET, DELAYED RELEASE ORAL ONCE A DAY
Status: ACTIVE | COMMUNITY

## 2024-09-17 RX ORDER — DULOXETINE 30 MG/1
1 CAPSULE CAPSULE, DELAYED RELEASE PELLETS ORAL ONCE A DAY
Status: ACTIVE | COMMUNITY

## 2024-09-17 RX ORDER — LISINOPRIL 10 MG/1
TAKE 1 TABLET (10 MG) BY ORAL ROUTE ONCE DAILY TABLET ORAL 1
Qty: 0 | Refills: 0 | COMMUNITY
Start: 1900-01-01

## 2024-09-17 RX ORDER — OMEPRAZOLE 40 MG/1
1 CAPSULE 30 MINUTES BEFORE MORNING MEAL CAPSULE, DELAYED RELEASE ORAL ONCE A DAY
Qty: 90 | Refills: 3 | Status: ACTIVE | COMMUNITY

## 2024-09-17 RX ORDER — LINACLOTIDE 72 UG/1
1 CAPSULE AT LEAST 30 MINUTES BEFORE THE FIRST MEAL OF THE DAY ON AN EMPTY STOMACH CAPSULE, GELATIN COATED ORAL ONCE A DAY
Qty: 90 | Refills: 3 | Status: ACTIVE | COMMUNITY

## 2024-09-17 RX ORDER — LINACLOTIDE 72 UG/1
1 CAPSULE AT LEAST 30 MINUTES BEFORE THE FIRST MEAL OF THE DAY ON AN EMPTY STOMACH CAPSULE, GELATIN COATED ORAL ONCE A DAY
Qty: 90 | Refills: 3 | COMMUNITY
Start: 2024-08-27 | End: 2025-08-22

## 2024-09-17 RX ORDER — FLUTICASONE FUROATE AND VILANTEROL TRIFENATATE 100; 25 UG/1; UG/1
INHALE 1 PUFF BY INHALATION ROUTE ONCE DAILY AT THE SAME TIME EACH DAY POWDER RESPIRATORY (INHALATION) 1
Qty: 1 | Refills: 0 | COMMUNITY
Start: 1900-01-01

## 2024-09-17 RX ORDER — DICYCLOMINE HYDROCHLORIDE 20 MG/1
TAKE 2 TABLETS (40 MG) BY ORAL ROUTE 3 TIMES PER DAY TABLET ORAL
Qty: 0 | Refills: 0 | COMMUNITY
Start: 1900-01-01

## 2024-09-17 RX ORDER — OMEPRAZOLE 40 MG/1
TAKE 1 CAPSULE (40 MG) BY ORAL ROUTE ONCE DAILY BEFORE A MEAL CAPSULE, DELAYED RELEASE PELLETS ORAL 1
Qty: 0 | Refills: 0 | COMMUNITY
Start: 1900-01-01

## 2024-09-29 ENCOUNTER — WEB ENCOUNTER (OUTPATIENT)
Dept: URBAN - METROPOLITAN AREA CLINIC 92 | Facility: CLINIC | Age: 59
End: 2024-09-29

## 2024-10-04 ENCOUNTER — OFFICE VISIT (OUTPATIENT)
Dept: URBAN - METROPOLITAN AREA MEDICAL CENTER 28 | Facility: MEDICAL CENTER | Age: 59
End: 2024-10-04
Payer: COMMERCIAL

## 2024-10-04 DIAGNOSIS — K31.89 OTHER DISEASES OF STOMACH AND DUODENUM: ICD-10-CM

## 2024-10-04 PROCEDURE — 43270 EGD LESION ABLATION: CPT | Performed by: INTERNAL MEDICINE

## 2024-10-04 RX ORDER — LINACLOTIDE 72 UG/1
1 CAPSULE AT LEAST 30 MINUTES BEFORE THE FIRST MEAL OF THE DAY ON AN EMPTY STOMACH CAPSULE, GELATIN COATED ORAL ONCE A DAY
Qty: 90 | Refills: 3 | Status: ACTIVE | COMMUNITY

## 2024-10-04 RX ORDER — LISINOPRIL 10 MG/1
TAKE 1 TABLET (10 MG) BY ORAL ROUTE ONCE DAILY TABLET ORAL 1
Qty: 0 | Refills: 0 | COMMUNITY
Start: 1900-01-01

## 2024-10-04 RX ORDER — LINACLOTIDE 72 UG/1
1 CAPSULE AT LEAST 30 MINUTES BEFORE THE FIRST MEAL OF THE DAY ON AN EMPTY STOMACH CAPSULE, GELATIN COATED ORAL ONCE A DAY
Qty: 90 | Refills: 3 | COMMUNITY
Start: 2024-08-27 | End: 2025-08-22

## 2024-10-04 RX ORDER — PANTOPRAZOLE SODIUM 40 MG/1
1 TABLET TABLET, DELAYED RELEASE ORAL ONCE A DAY
Qty: 90 TABLET | Refills: 1 | Status: ACTIVE | COMMUNITY

## 2024-10-04 RX ORDER — PANTOPRAZOLE SODIUM 40 MG/1
1 TABLET TABLET, DELAYED RELEASE ORAL ONCE A DAY
Status: ACTIVE | COMMUNITY

## 2024-10-04 RX ORDER — OMEPRAZOLE 40 MG/1
1 CAPSULE 30 MINUTES BEFORE MORNING MEAL CAPSULE, DELAYED RELEASE ORAL ONCE A DAY
Qty: 90 | Refills: 3 | Status: ACTIVE | COMMUNITY

## 2024-10-04 RX ORDER — FLUTICASONE FUROATE AND VILANTEROL TRIFENATATE 100; 25 UG/1; UG/1
INHALE 1 PUFF BY INHALATION ROUTE ONCE DAILY AT THE SAME TIME EACH DAY POWDER RESPIRATORY (INHALATION) 1
Qty: 1 | Refills: 0 | COMMUNITY
Start: 1900-01-01

## 2024-10-04 RX ORDER — DULOXETINE 30 MG/1
1 CAPSULE CAPSULE, DELAYED RELEASE PELLETS ORAL ONCE A DAY
Status: ACTIVE | COMMUNITY

## 2024-10-04 RX ORDER — FAMOTIDINE 40 MG/1
1 TABLET AT BEDTIME TABLET, FILM COATED ORAL ONCE A DAY
Qty: 90 TABLET | Refills: 1 | Status: ACTIVE | COMMUNITY

## 2024-10-04 RX ORDER — DICYCLOMINE HYDROCHLORIDE 20 MG/1
TAKE 2 TABLETS (40 MG) BY ORAL ROUTE 3 TIMES PER DAY TABLET ORAL
Qty: 0 | Refills: 0 | COMMUNITY
Start: 1900-01-01

## 2024-10-04 RX ORDER — OMEPRAZOLE 40 MG/1
TAKE 1 CAPSULE (40 MG) BY ORAL ROUTE ONCE DAILY BEFORE A MEAL CAPSULE, DELAYED RELEASE PELLETS ORAL 1
Qty: 0 | Refills: 0 | COMMUNITY
Start: 1900-01-01

## 2024-10-04 RX ORDER — SERTRALINE 100 MG/1
TAKE 1 TABLET (100 MG) BY ORAL ROUTE ONCE DAILY TABLET, FILM COATED ORAL 1
Qty: 0 | Refills: 0 | COMMUNITY
Start: 1900-01-01

## 2024-10-05 ENCOUNTER — TELEPHONE ENCOUNTER (OUTPATIENT)
Dept: URBAN - METROPOLITAN AREA CLINIC 92 | Facility: CLINIC | Age: 59
End: 2024-10-05

## 2024-10-06 ENCOUNTER — CLAIMS CREATED FROM THE CLAIM WINDOW (OUTPATIENT)
Dept: URBAN - METROPOLITAN AREA MEDICAL CENTER 28 | Facility: MEDICAL CENTER | Age: 59
End: 2024-10-06
Payer: COMMERCIAL

## 2024-10-06 DIAGNOSIS — R11.2 ACUTE NAUSEA WITH NONBILIOUS VOMITING: ICD-10-CM

## 2024-10-06 DIAGNOSIS — R93.3 ABN FINDINGS-GI TRACT: ICD-10-CM

## 2024-10-06 DIAGNOSIS — R10.84 ABDOMINAL CRAMPING, GENERALIZED: ICD-10-CM

## 2024-10-06 PROCEDURE — 99222 1ST HOSP IP/OBS MODERATE 55: CPT | Performed by: INTERNAL MEDICINE

## 2024-10-06 PROCEDURE — 99254 IP/OBS CNSLTJ NEW/EST MOD 60: CPT | Performed by: INTERNAL MEDICINE

## 2024-10-07 ENCOUNTER — CLAIMS CREATED FROM THE CLAIM WINDOW (OUTPATIENT)
Dept: URBAN - METROPOLITAN AREA MEDICAL CENTER 28 | Facility: MEDICAL CENTER | Age: 59
End: 2024-10-07
Payer: COMMERCIAL

## 2024-10-07 DIAGNOSIS — R93.3 ABN FINDINGS-GI TRACT: ICD-10-CM

## 2024-10-07 PROCEDURE — 99232 SBSQ HOSP IP/OBS MODERATE 35: CPT | Performed by: INTERNAL MEDICINE

## 2024-10-08 ENCOUNTER — CLAIMS CREATED FROM THE CLAIM WINDOW (OUTPATIENT)
Dept: URBAN - METROPOLITAN AREA MEDICAL CENTER 28 | Facility: MEDICAL CENTER | Age: 59
End: 2024-10-08
Payer: COMMERCIAL

## 2024-10-08 ENCOUNTER — OFFICE VISIT (OUTPATIENT)
Dept: URBAN - METROPOLITAN AREA CLINIC 92 | Facility: CLINIC | Age: 59
End: 2024-10-08

## 2024-10-08 DIAGNOSIS — R79.89 ABNORMAL BILIRUBIN TEST: ICD-10-CM

## 2024-10-08 DIAGNOSIS — R04.0 ANTERIOR EPISTAXIS: ICD-10-CM

## 2024-10-08 DIAGNOSIS — R93.3 ABN FINDINGS-GI TRACT: ICD-10-CM

## 2024-10-08 PROCEDURE — 99233 SBSQ HOSP IP/OBS HIGH 50: CPT | Performed by: INTERNAL MEDICINE

## 2024-10-08 RX ORDER — LINACLOTIDE 72 UG/1
1 CAPSULE AT LEAST 30 MINUTES BEFORE THE FIRST MEAL OF THE DAY ON AN EMPTY STOMACH CAPSULE, GELATIN COATED ORAL ONCE A DAY
Qty: 90 | Refills: 3 | COMMUNITY

## 2024-10-08 RX ORDER — DICYCLOMINE HYDROCHLORIDE 20 MG/1
TAKE 2 TABLETS (40 MG) BY ORAL ROUTE 3 TIMES PER DAY TABLET ORAL
Qty: 0 | Refills: 0 | COMMUNITY
Start: 1900-01-01

## 2024-10-08 RX ORDER — LINACLOTIDE 72 UG/1
1 CAPSULE AT LEAST 30 MINUTES BEFORE THE FIRST MEAL OF THE DAY ON AN EMPTY STOMACH CAPSULE, GELATIN COATED ORAL ONCE A DAY
Qty: 90 | Refills: 3 | COMMUNITY
Start: 2024-08-27 | End: 2025-08-22

## 2024-10-08 RX ORDER — LISINOPRIL 10 MG/1
TAKE 1 TABLET (10 MG) BY ORAL ROUTE ONCE DAILY TABLET ORAL 1
Qty: 0 | Refills: 0 | COMMUNITY
Start: 1900-01-01

## 2024-10-08 RX ORDER — SERTRALINE 100 MG/1
TAKE 1 TABLET (100 MG) BY ORAL ROUTE ONCE DAILY TABLET, FILM COATED ORAL 1
Qty: 0 | Refills: 0 | COMMUNITY
Start: 1900-01-01

## 2024-10-08 RX ORDER — PANTOPRAZOLE SODIUM 40 MG/1
1 TABLET TABLET, DELAYED RELEASE ORAL ONCE A DAY
Qty: 90 TABLET | Refills: 1 | COMMUNITY

## 2024-10-08 RX ORDER — FAMOTIDINE 40 MG/1
1 TABLET AT BEDTIME TABLET, FILM COATED ORAL ONCE A DAY
Qty: 90 TABLET | Refills: 1 | COMMUNITY

## 2024-10-08 RX ORDER — OMEPRAZOLE 40 MG/1
TAKE 1 CAPSULE (40 MG) BY ORAL ROUTE ONCE DAILY BEFORE A MEAL CAPSULE, DELAYED RELEASE PELLETS ORAL 1
Qty: 0 | Refills: 0 | COMMUNITY
Start: 1900-01-01

## 2024-10-08 RX ORDER — PANTOPRAZOLE SODIUM 40 MG/1
1 TABLET TABLET, DELAYED RELEASE ORAL ONCE A DAY
COMMUNITY

## 2024-10-08 RX ORDER — FLUTICASONE FUROATE AND VILANTEROL TRIFENATATE 100; 25 UG/1; UG/1
INHALE 1 PUFF BY INHALATION ROUTE ONCE DAILY AT THE SAME TIME EACH DAY POWDER RESPIRATORY (INHALATION) 1
Qty: 1 | Refills: 0 | COMMUNITY
Start: 1900-01-01

## 2024-10-08 RX ORDER — DULOXETINE 30 MG/1
1 CAPSULE CAPSULE, DELAYED RELEASE PELLETS ORAL ONCE A DAY
COMMUNITY

## 2024-10-08 RX ORDER — OMEPRAZOLE 40 MG/1
1 CAPSULE 30 MINUTES BEFORE MORNING MEAL CAPSULE, DELAYED RELEASE ORAL ONCE A DAY
Qty: 90 | Refills: 3 | COMMUNITY

## 2024-10-09 ENCOUNTER — CLAIMS CREATED FROM THE CLAIM WINDOW (OUTPATIENT)
Dept: URBAN - METROPOLITAN AREA MEDICAL CENTER 28 | Facility: MEDICAL CENTER | Age: 59
End: 2024-10-09
Payer: COMMERCIAL

## 2024-10-09 DIAGNOSIS — K63.1 DUODENAL PERFORATION: ICD-10-CM

## 2024-10-09 DIAGNOSIS — R79.89 ABNORMAL BILIRUBIN TEST: ICD-10-CM

## 2024-10-09 DIAGNOSIS — R04.0 EPISTAXIS: ICD-10-CM

## 2024-10-09 PROCEDURE — 99232 SBSQ HOSP IP/OBS MODERATE 35: CPT | Performed by: INTERNAL MEDICINE

## 2024-10-10 ENCOUNTER — CLAIMS CREATED FROM THE CLAIM WINDOW (OUTPATIENT)
Dept: URBAN - METROPOLITAN AREA MEDICAL CENTER 28 | Facility: MEDICAL CENTER | Age: 59
End: 2024-10-10
Payer: COMMERCIAL

## 2024-10-10 DIAGNOSIS — K63.1 DUODENAL PERFORATION: ICD-10-CM

## 2024-10-10 PROCEDURE — 99232 SBSQ HOSP IP/OBS MODERATE 35: CPT | Performed by: INTERNAL MEDICINE

## 2024-10-11 ENCOUNTER — CLAIMS CREATED FROM THE CLAIM WINDOW (OUTPATIENT)
Dept: URBAN - METROPOLITAN AREA MEDICAL CENTER 28 | Facility: MEDICAL CENTER | Age: 59
End: 2024-10-11
Payer: COMMERCIAL

## 2024-10-11 DIAGNOSIS — K63.1 DUODENAL PERFORATION: ICD-10-CM

## 2024-10-11 DIAGNOSIS — K62.89 RECTAL PAIN: ICD-10-CM

## 2024-10-11 DIAGNOSIS — R74.8 ABNORMAL ALKALINE PHOSPHATASE TEST: ICD-10-CM

## 2024-10-11 DIAGNOSIS — K82.8 OTHER SPECIFIED DISEASES OF GALLBLADDER: ICD-10-CM

## 2024-10-11 PROCEDURE — 99232 SBSQ HOSP IP/OBS MODERATE 35: CPT | Performed by: INTERNAL MEDICINE

## 2024-10-14 ENCOUNTER — CLAIMS CREATED FROM THE CLAIM WINDOW (OUTPATIENT)
Dept: URBAN - METROPOLITAN AREA MEDICAL CENTER 28 | Facility: MEDICAL CENTER | Age: 59
End: 2024-10-14
Payer: COMMERCIAL

## 2024-10-14 DIAGNOSIS — K82.8 ADENOMYOSIS OF GALLBLADDER: ICD-10-CM

## 2024-10-14 DIAGNOSIS — R93.3 ABN FINDINGS-GI TRACT: ICD-10-CM

## 2024-10-14 DIAGNOSIS — K62.89 ACUTE PROCTITIS: ICD-10-CM

## 2024-10-14 DIAGNOSIS — R79.89 ABNORMAL BILIRUBIN TEST: ICD-10-CM

## 2024-10-14 PROCEDURE — 99232 SBSQ HOSP IP/OBS MODERATE 35: CPT | Performed by: INTERNAL MEDICINE

## 2024-10-15 ENCOUNTER — CLAIMS CREATED FROM THE CLAIM WINDOW (OUTPATIENT)
Dept: URBAN - METROPOLITAN AREA MEDICAL CENTER 28 | Facility: MEDICAL CENTER | Age: 59
End: 2024-10-15
Payer: COMMERCIAL

## 2024-10-15 DIAGNOSIS — K82.8 ADENOMYOSIS OF GALLBLADDER: ICD-10-CM

## 2024-10-15 DIAGNOSIS — K68.19 ABSCESS, RETROPERITONEAL: ICD-10-CM

## 2024-10-15 DIAGNOSIS — K62.89 ACUTE PROCTITIS: ICD-10-CM

## 2024-10-15 PROCEDURE — 99232 SBSQ HOSP IP/OBS MODERATE 35: CPT | Performed by: INTERNAL MEDICINE

## 2024-10-16 ENCOUNTER — CLAIMS CREATED FROM THE CLAIM WINDOW (OUTPATIENT)
Dept: URBAN - METROPOLITAN AREA MEDICAL CENTER 28 | Facility: MEDICAL CENTER | Age: 59
End: 2024-10-16
Payer: COMMERCIAL

## 2024-10-16 DIAGNOSIS — K68.19 ABSCESS, RETROPERITONEAL: ICD-10-CM

## 2024-10-16 DIAGNOSIS — K62.89 ACUTE PROCTITIS: ICD-10-CM

## 2024-10-16 DIAGNOSIS — K82.8 ADENOMYOSIS OF GALLBLADDER: ICD-10-CM

## 2024-10-16 PROCEDURE — 99232 SBSQ HOSP IP/OBS MODERATE 35: CPT | Performed by: INTERNAL MEDICINE

## 2024-10-18 ENCOUNTER — CLAIMS CREATED FROM THE CLAIM WINDOW (OUTPATIENT)
Dept: URBAN - METROPOLITAN AREA MEDICAL CENTER 28 | Facility: MEDICAL CENTER | Age: 59
End: 2024-10-18
Payer: COMMERCIAL

## 2024-10-18 DIAGNOSIS — R93.3 ABNORMAL FINDINGS ON DIAGNOSTIC IMAGING OF OTHER PARTS OF DIGESTIVE TRACT: ICD-10-CM

## 2024-10-18 DIAGNOSIS — K62.89 OTHER SPECIFIED DISEASES OF ANUS AND RECTUM: ICD-10-CM

## 2024-10-18 DIAGNOSIS — R74.8 ABNORMAL LEVELS OF OTHER SERUM ENZYMES: ICD-10-CM

## 2024-10-18 PROCEDURE — 99232 SBSQ HOSP IP/OBS MODERATE 35: CPT | Performed by: INTERNAL MEDICINE

## 2024-10-19 ENCOUNTER — WEB ENCOUNTER (OUTPATIENT)
Dept: URBAN - METROPOLITAN AREA CLINIC 92 | Facility: CLINIC | Age: 59
End: 2024-10-19

## 2024-10-29 ENCOUNTER — OFFICE VISIT (OUTPATIENT)
Dept: URBAN - METROPOLITAN AREA CLINIC 92 | Facility: CLINIC | Age: 59
End: 2024-10-29

## 2024-10-29 RX ORDER — LINACLOTIDE 72 UG/1
1 CAPSULE AT LEAST 30 MINUTES BEFORE THE FIRST MEAL OF THE DAY ON AN EMPTY STOMACH CAPSULE, GELATIN COATED ORAL ONCE A DAY
Qty: 90 | Refills: 3 | Status: ACTIVE | COMMUNITY
Start: 2024-08-27 | End: 2025-08-22

## 2024-10-29 RX ORDER — SERTRALINE 100 MG/1
TAKE 1 TABLET (100 MG) BY ORAL ROUTE ONCE DAILY TABLET, FILM COATED ORAL 1
Qty: 0 | Refills: 0 | Status: ACTIVE | COMMUNITY
Start: 1900-01-01

## 2024-10-29 RX ORDER — LINACLOTIDE 72 UG/1
1 CAPSULE AT LEAST 30 MINUTES BEFORE THE FIRST MEAL OF THE DAY ON AN EMPTY STOMACH CAPSULE, GELATIN COATED ORAL ONCE A DAY
Qty: 90 | Refills: 3 | Status: ACTIVE | COMMUNITY

## 2024-10-29 RX ORDER — OMEPRAZOLE 40 MG/1
TAKE 1 CAPSULE (40 MG) BY ORAL ROUTE ONCE DAILY BEFORE A MEAL CAPSULE, DELAYED RELEASE PELLETS ORAL 1
Qty: 0 | Refills: 0 | Status: ACTIVE | COMMUNITY
Start: 1900-01-01

## 2024-10-29 RX ORDER — PANTOPRAZOLE SODIUM 40 MG/1
1 TABLET TABLET, DELAYED RELEASE ORAL ONCE A DAY
Status: ACTIVE | COMMUNITY

## 2024-10-29 RX ORDER — DULOXETINE 30 MG/1
1 CAPSULE CAPSULE, DELAYED RELEASE PELLETS ORAL ONCE A DAY
Status: ACTIVE | COMMUNITY

## 2024-10-29 RX ORDER — LISINOPRIL 10 MG/1
TAKE 1 TABLET (10 MG) BY ORAL ROUTE ONCE DAILY TABLET ORAL 1
Qty: 0 | Refills: 0 | Status: ACTIVE | COMMUNITY
Start: 1900-01-01

## 2024-10-29 RX ORDER — DICYCLOMINE HYDROCHLORIDE 20 MG/1
TAKE 2 TABLETS (40 MG) BY ORAL ROUTE 3 TIMES PER DAY TABLET ORAL
Qty: 0 | Refills: 0 | Status: ACTIVE | COMMUNITY
Start: 1900-01-01

## 2024-10-29 RX ORDER — OMEPRAZOLE 40 MG/1
1 CAPSULE 30 MINUTES BEFORE MORNING MEAL CAPSULE, DELAYED RELEASE ORAL ONCE A DAY
Qty: 90 | Refills: 3 | Status: ACTIVE | COMMUNITY

## 2024-10-29 RX ORDER — FLUTICASONE FUROATE AND VILANTEROL TRIFENATATE 100; 25 UG/1; UG/1
INHALE 1 PUFF BY INHALATION ROUTE ONCE DAILY AT THE SAME TIME EACH DAY POWDER RESPIRATORY (INHALATION) 1
Qty: 1 | Refills: 0 | Status: ACTIVE | COMMUNITY
Start: 1900-01-01

## 2024-10-29 RX ORDER — FAMOTIDINE 40 MG/1
1 TABLET AT BEDTIME TABLET, FILM COATED ORAL ONCE A DAY
Qty: 90 TABLET | Refills: 1 | Status: ACTIVE | COMMUNITY

## 2024-10-29 RX ORDER — PANTOPRAZOLE SODIUM 40 MG/1
1 TABLET TABLET, DELAYED RELEASE ORAL ONCE A DAY
Qty: 90 TABLET | Refills: 1 | Status: ACTIVE | COMMUNITY

## 2024-10-29 NOTE — HPI-TODAY'S VISIT:
59-year-old female who now presents for follow-up.   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  She underwent EGD 03/10/2020 that demonstrated a 3 cm HH and non-erosive gastritis.  Biopsy of the esophagus showed reflux changes.  Biopsies demonstrated gastritis with possible H.pylori gastritis.  There was focal thickening in the 2nd portion of the duodenum with irregularity which biopsies revealed tubular adenoma without dysplasia. She underwent  EGD/EUS on 05/18/2020 that again demonstrated a 2 cm HH and reactive gastropathy w/o HP, +HP and Fundic gland polyps. There was a 2.2 cm sessile polyp in the 2nd portion of the duodenum distal to the ampulla with carpet like extension.  There was a nodular mucosa adjacent to the papilla which biopsies revealed prominent Brunner's gland.  Diffuse echogenicity of the pancreas was also noted.  She had a repeat procedure on 08/03/2020 and had endoscopic mucosal resection of a 2.5 cm adenoma which the resection was incomplete.  She underwent surgical resection by Dr. Josué Melendez.  She had repeat procedure on 11/23/2020 that showed a patent anastomosis in the 2nd portion duodenum with residual 2 mm duodenal polyp at the anastomosis and the biopsies showed adenomatous changes. EGD 7/21/22: 2cm HH, mild chronic gastritis-no h pylori , two 4 to 12mm sessile duodenal polyps-tubular adenomas. Repeat in 6 months- She did not f/u with this. States GERD managed with current meds for now.  When I saw her she was c/o choking on dry food and certain drinks x several months. Was also taking omeprazole 40mg QAM but still had reflux. Barium swallow 9-2022 demonstrated a 12.5 mm barium tablet at the level of the distal thoracic esophagus and did not pass even with additional swallows and mild stricture cannot be excluded. She did not f/u after. Continues to complain of dysphagia where bread, meats typically get stuck in her mid-lower chest and then causes pain after sitting. Slowly it passes on its own without intervention. Typically occurs if she turns her head to the right when swallowing. Reports randomly this occurs. Currently denies choking or aspiration. EGD 8/15/24: single duodeal polyp bx showed duodenal adenoma with low grade dysphagsia, bx of esophagus was normal no EOE or IM. After her procedure she started to have abd pain n/v. CT was ordered which showed hepatomegaly ith fatty infiltration, 7.9 mm nonspecifid noncalicifed nodule in the left lower lobe f/u in 3 months, s/p hysterectomy. EGD 10/04/24 with Dr. Gee revealed 3cm HH, gastritis, A few duodenal polyps along prior polypectomy site tx with APC, no specimens collected, 3 month repeat  Started on Linzess 72mcg which helped her BM. Still has rectal bleeding and has IH banding with Dr VENTURA next week. Feels some weakness. Colonoscopy 8/15/24: fair prep, IH and EH, diverticulosis in DC,AC and SC, 2 HP and 1 TA recall 3 yrs  States she was having some flank pain in the past few months and she has a h/o kidney stone. Her urologist obtained a CT scan which showed a small kidney stone and no other abnormalities. She by the urologist when he viewed the image there was stool in her rectum which was compressing her bladder and causing her to have increased urination.  I viewed CT results today and this was not noted by radiologist.  Per urologists notes he believes there is some component of bladder dysfunction.

## 2024-10-30 ENCOUNTER — OFFICE VISIT (OUTPATIENT)
Dept: URBAN - METROPOLITAN AREA CLINIC 92 | Facility: CLINIC | Age: 59
End: 2024-10-30

## 2024-11-07 ENCOUNTER — CLAIMS CREATED FROM THE CLAIM WINDOW (OUTPATIENT)
Dept: URBAN - METROPOLITAN AREA MEDICAL CENTER 28 | Facility: MEDICAL CENTER | Age: 59
End: 2024-11-07

## 2024-11-07 ENCOUNTER — CLAIMS CREATED FROM THE CLAIM WINDOW (OUTPATIENT)
Dept: URBAN - METROPOLITAN AREA MEDICAL CENTER 28 | Facility: MEDICAL CENTER | Age: 59
End: 2024-11-07
Payer: COMMERCIAL

## 2024-11-07 DIAGNOSIS — R74.8 ABNORMAL LEVELS OF OTHER SERUM ENZYMES: ICD-10-CM

## 2024-11-07 PROCEDURE — 99255 IP/OBS CONSLTJ NEW/EST HI 80: CPT | Performed by: PHYSICIAN ASSISTANT

## 2024-11-07 PROCEDURE — 99254 IP/OBS CNSLTJ NEW/EST MOD 60: CPT | Performed by: INTERNAL MEDICINE

## 2024-11-07 PROCEDURE — 99222 1ST HOSP IP/OBS MODERATE 55: CPT | Performed by: INTERNAL MEDICINE

## 2024-11-07 PROCEDURE — G8427 DOCREV CUR MEDS BY ELIG CLIN: HCPCS | Performed by: INTERNAL MEDICINE

## 2024-11-08 ENCOUNTER — CLAIMS CREATED FROM THE CLAIM WINDOW (OUTPATIENT)
Dept: URBAN - METROPOLITAN AREA MEDICAL CENTER 28 | Facility: MEDICAL CENTER | Age: 59
End: 2024-11-08
Payer: COMMERCIAL

## 2024-11-08 ENCOUNTER — CLAIMS CREATED FROM THE CLAIM WINDOW (OUTPATIENT)
Dept: URBAN - METROPOLITAN AREA MEDICAL CENTER 28 | Facility: MEDICAL CENTER | Age: 59
End: 2024-11-08

## 2024-11-08 DIAGNOSIS — R74.8 ABNORMAL LEVELS OF OTHER SERUM ENZYMES: ICD-10-CM

## 2024-11-08 PROCEDURE — 99232 SBSQ HOSP IP/OBS MODERATE 35: CPT | Performed by: INTERNAL MEDICINE

## 2024-11-08 PROCEDURE — 99232 SBSQ HOSP IP/OBS MODERATE 35: CPT

## 2024-11-12 ENCOUNTER — TELEPHONE ENCOUNTER (OUTPATIENT)
Dept: URBAN - METROPOLITAN AREA CLINIC 92 | Facility: CLINIC | Age: 59
End: 2024-11-12

## 2024-11-12 ENCOUNTER — OFFICE VISIT (OUTPATIENT)
Dept: URBAN - METROPOLITAN AREA TELEHEALTH 2 | Facility: TELEHEALTH | Age: 59
End: 2024-11-12
Payer: COMMERCIAL

## 2024-11-12 VITALS — BODY MASS INDEX: 31.55 KG/M2 | HEIGHT: 67 IN | WEIGHT: 201 LBS

## 2024-11-12 DIAGNOSIS — D13.2 DUODENAL ADENOMA: ICD-10-CM

## 2024-11-12 DIAGNOSIS — R19.5 DARK STOOLS: ICD-10-CM

## 2024-11-12 DIAGNOSIS — K59.04 CHRONIC IDIOPATHIC CONSTIPATION: ICD-10-CM

## 2024-11-12 DIAGNOSIS — K64.8 INTERNAL HEMORRHOID: ICD-10-CM

## 2024-11-12 DIAGNOSIS — Z86.0100 HISTORY OF COLON POLYPS: ICD-10-CM

## 2024-11-12 DIAGNOSIS — K62.5 RECTAL BLEEDING: ICD-10-CM

## 2024-11-12 DIAGNOSIS — K57.30 DIVERTICULOSIS: ICD-10-CM

## 2024-11-12 DIAGNOSIS — K21.00 GASTROESOPHAGEAL REFLUX DISEASE WITH ESOPHAGITIS WITHOUT HEMORRHAGE: ICD-10-CM

## 2024-11-12 PROCEDURE — 99214 OFFICE O/P EST MOD 30 MIN: CPT

## 2024-11-12 RX ORDER — LISINOPRIL 10 MG/1
TAKE 1 TABLET (10 MG) BY ORAL ROUTE ONCE DAILY TABLET ORAL 1
Qty: 0 | Refills: 0 | Status: ACTIVE | COMMUNITY
Start: 1900-01-01

## 2024-11-12 RX ORDER — SERTRALINE 100 MG/1
TAKE 1 TABLET (100 MG) BY ORAL ROUTE ONCE DAILY TABLET, FILM COATED ORAL 1
Qty: 0 | Refills: 0 | Status: ACTIVE | COMMUNITY
Start: 1900-01-01

## 2024-11-12 RX ORDER — PANTOPRAZOLE SODIUM 40 MG/1
1 TABLET TABLET, DELAYED RELEASE ORAL ONCE A DAY
Qty: 90 TABLET | Refills: 1 | OUTPATIENT

## 2024-11-12 RX ORDER — PANTOPRAZOLE SODIUM 40 MG/1
1 TABLET TABLET, DELAYED RELEASE ORAL ONCE A DAY
Qty: 90 TABLET | Refills: 1 | Status: ACTIVE | COMMUNITY

## 2024-11-12 RX ORDER — FLUTICASONE FUROATE AND VILANTEROL TRIFENATATE 100; 25 UG/1; UG/1
INHALE 1 PUFF BY INHALATION ROUTE ONCE DAILY AT THE SAME TIME EACH DAY POWDER RESPIRATORY (INHALATION) 1
Qty: 1 | Refills: 0 | Status: ACTIVE | COMMUNITY
Start: 1900-01-01

## 2024-11-12 RX ORDER — DULOXETINE 30 MG/1
1 CAPSULE CAPSULE, DELAYED RELEASE PELLETS ORAL ONCE A DAY
Status: ACTIVE | COMMUNITY

## 2024-11-12 RX ORDER — FAMOTIDINE 40 MG/1
1 TABLET AT BEDTIME TABLET, FILM COATED ORAL ONCE A DAY
Qty: 90 TABLET | Refills: 1 | OUTPATIENT

## 2024-11-12 RX ORDER — FAMOTIDINE 40 MG/1
1 TABLET AT BEDTIME TABLET, FILM COATED ORAL ONCE A DAY
Qty: 90 TABLET | Refills: 1 | Status: ACTIVE | COMMUNITY

## 2024-11-12 NOTE — HPI-TODAY'S VISIT:
59-year-old female who now presents for follow-up.   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  She underwent EGD 03/10/2020 that demonstrated a 3 cm HH and non-erosive gastritis.  Biopsy of the esophagus showed reflux changes.  Biopsies demonstrated gastritis with possible H.pylori gastritis.  There was focal thickening in the 2nd portion of the duodenum with irregularity which biopsies revealed tubular adenoma without dysplasia. She underwent  EGD/EUS on 05/18/2020 that again demonstrated a 2 cm HH and reactive gastropathy w/o HP, +HP and Fundic gland polyps. There was a 2.2 cm sessile polyp in the 2nd portion of the duodenum distal to the ampulla with carpet like extension.  There was a nodular mucosa adjacent to the papilla which biopsies revealed prominent Brunner's gland.  Diffuse echogenicity of the pancreas was also noted.  She had a repeat procedure on 08/03/2020 and had endoscopic mucosal resection of a 2.5 cm adenoma which the resection was incomplete.  She underwent surgical resection by Dr. Josué Melendez.  She had repeat procedure on 11/23/2020 that showed a patent anastomosis in the 2nd portion duodenum with residual 2 mm duodenal polyp at the anastomosis and the biopsies showed adenomatous changes. EGD 7/21/22: 2cm HH, mild chronic gastritis-no h pylori, two 4 to 12mm sessile duodenal polyps-tubular adenomas. Repeat in 6 months. EGD 8/15/24: single duodeal polyp bx showed duodenal adenoma with low grade dysphagsia, bx of esophagus was normal no EOE or IM. After her procedure she started to have abd pain n/v. CT was ordered which showed hepatomegaly ith fatty infiltration, 7.9 mm nonspecifid noncalicifed nodule in the left lower lobe f/u in 3 months, s/p hysterectomy. She will need a repeat EGD in 1-2 months with possible APC.   After last visit patient had a endoscopy on 10-4-2024 which demonstrated 3 cm hiatal hernia, scar consistent with prior polypectomy few flat polyps were around the second portion of the duodenum coagulation was done no biopsies were done. After the procedure patient developed severe abdominal pain with fever, nausea and vomiting. She went to [facility] and CT demonstrated stranding the third portion of the duodenum with a 3 cm fluid collection without free air consistent with contained perforation she was then transferred to [facility]. She had a CT scan 10/5 at [facility] that demonstrated duodenal perforation likely at the junction between the second and third portions of duodenum small amount of fluid, contrast and gas. She was placed on tpn and NPO. She was discharged on antibiotics on full liquid diet. She presented to hepatobiliary surgery on 10- They noted no role for empiric resection at this point. Plan is to advance diet and imaging in 3 months and EGD for surveillance and biopsy only in 6 months would avoid any further attempts at endoscopic intervention. If recurrent dysplasia will need to discuss duodenal removal or Whipple procedure. She has been MRI scheduled for January/February 2025and they will follow-up with her then.   Today notes she is still having some stomach cramping. She has uses dicyclomine prn which helps. She is on PPI in the morning and H2B at night which controls her GERD. She was on Linzess for constipation but was taken off during hospitalization. She now has tarry black stool. She also had this in the hospital. She is not on pepto Bismol or iron. No BRBPR. She did have 1 IH banding before she went to the hospital which helped her bleeding. Colonoscopy 8/15/24: fair prep, IH and EH, diverticulosis in DC,AC and SC, 2 HP and 1 TA recall 3 yrs

## 2024-11-15 ENCOUNTER — OFFICE VISIT (OUTPATIENT)
Dept: URBAN - METROPOLITAN AREA CLINIC 92 | Facility: CLINIC | Age: 59
End: 2024-11-15

## 2024-11-15 RX ORDER — FAMOTIDINE 40 MG/1
1 TABLET AT BEDTIME TABLET, FILM COATED ORAL ONCE A DAY
Qty: 90 TABLET | Refills: 1 | COMMUNITY

## 2024-11-15 RX ORDER — SERTRALINE 100 MG/1
TAKE 1 TABLET (100 MG) BY ORAL ROUTE ONCE DAILY TABLET, FILM COATED ORAL 1
Qty: 0 | Refills: 0 | COMMUNITY
Start: 1900-01-01

## 2024-11-15 RX ORDER — LISINOPRIL 10 MG/1
TAKE 1 TABLET (10 MG) BY ORAL ROUTE ONCE DAILY TABLET ORAL 1
Qty: 0 | Refills: 0 | COMMUNITY
Start: 1900-01-01

## 2024-11-15 RX ORDER — PANTOPRAZOLE SODIUM 40 MG/1
1 TABLET TABLET, DELAYED RELEASE ORAL ONCE A DAY
Qty: 90 TABLET | Refills: 1 | COMMUNITY

## 2024-11-15 RX ORDER — DULOXETINE 30 MG/1
1 CAPSULE CAPSULE, DELAYED RELEASE PELLETS ORAL ONCE A DAY
COMMUNITY

## 2024-11-15 RX ORDER — FLUTICASONE FUROATE AND VILANTEROL TRIFENATATE 100; 25 UG/1; UG/1
INHALE 1 PUFF BY INHALATION ROUTE ONCE DAILY AT THE SAME TIME EACH DAY POWDER RESPIRATORY (INHALATION) 1
Qty: 1 | Refills: 0 | COMMUNITY
Start: 1900-01-01

## 2025-04-10 ENCOUNTER — OFFICE VISIT (OUTPATIENT)
Dept: URBAN - METROPOLITAN AREA SURGERY CENTER 16 | Facility: SURGERY CENTER | Age: 60
End: 2025-04-10

## 2025-05-02 ENCOUNTER — OFFICE VISIT (OUTPATIENT)
Dept: URBAN - METROPOLITAN AREA CLINIC 92 | Facility: CLINIC | Age: 60
End: 2025-05-02

## 2025-05-08 ENCOUNTER — OFFICE VISIT (OUTPATIENT)
Dept: URBAN - METROPOLITAN AREA SURGERY CENTER 16 | Facility: SURGERY CENTER | Age: 60
End: 2025-05-08
Payer: COMMERCIAL

## 2025-05-08 DIAGNOSIS — K44.9 HIATAL HERNIA: ICD-10-CM

## 2025-05-08 DIAGNOSIS — K29.80 DUODENITIS: ICD-10-CM

## 2025-05-08 DIAGNOSIS — K31.89 OTHER DISEASES OF STOMACH AND DUODENUM: ICD-10-CM

## 2025-05-08 DIAGNOSIS — E03.9 HYPOTHYROID: ICD-10-CM

## 2025-05-08 DIAGNOSIS — K29.70 GASTRITIS: ICD-10-CM

## 2025-05-08 DIAGNOSIS — K21.9 GASTRIC REFLUX: ICD-10-CM

## 2025-05-08 DIAGNOSIS — Z86.018 PERSONAL HISTORY OF OTHER BENIGN NEOPLASM: ICD-10-CM

## 2025-05-08 PROCEDURE — 43239 EGD BIOPSY SINGLE/MULTIPLE: CPT | Performed by: INTERNAL MEDICINE

## 2025-05-08 PROCEDURE — 00731 ANES UPR GI NDSC PX NOS: CPT | Performed by: NURSE ANESTHETIST, CERTIFIED REGISTERED

## 2025-05-08 RX ORDER — LISINOPRIL 10 MG/1
TAKE 1 TABLET (10 MG) BY ORAL ROUTE ONCE DAILY TABLET ORAL 1
Qty: 0 | Refills: 0 | COMMUNITY
Start: 1900-01-01

## 2025-05-08 RX ORDER — FAMOTIDINE 40 MG/1
1 TABLET AT BEDTIME TABLET, FILM COATED ORAL ONCE A DAY
Qty: 90 TABLET | Refills: 1 | COMMUNITY

## 2025-05-08 RX ORDER — FLUTICASONE FUROATE AND VILANTEROL TRIFENATATE 100; 25 UG/1; UG/1
INHALE 1 PUFF BY INHALATION ROUTE ONCE DAILY AT THE SAME TIME EACH DAY POWDER RESPIRATORY (INHALATION) 1
Qty: 1 | Refills: 0 | COMMUNITY
Start: 1900-01-01

## 2025-05-08 RX ORDER — SERTRALINE 100 MG/1
TAKE 1 TABLET (100 MG) BY ORAL ROUTE ONCE DAILY TABLET, FILM COATED ORAL 1
Qty: 0 | Refills: 0 | COMMUNITY
Start: 1900-01-01

## 2025-05-08 RX ORDER — PANTOPRAZOLE SODIUM 40 MG/1
1 TABLET TABLET, DELAYED RELEASE ORAL ONCE A DAY
Qty: 90 TABLET | Refills: 1 | COMMUNITY

## 2025-05-08 RX ORDER — DULOXETINE 30 MG/1
1 CAPSULE CAPSULE, DELAYED RELEASE PELLETS ORAL ONCE A DAY
COMMUNITY

## 2025-06-04 ENCOUNTER — OFFICE VISIT (OUTPATIENT)
Dept: URBAN - METROPOLITAN AREA CLINIC 92 | Facility: CLINIC | Age: 60
End: 2025-06-04
Payer: COMMERCIAL

## 2025-06-04 DIAGNOSIS — K21.00 GASTROESOPHAGEAL REFLUX DISEASE WITH ESOPHAGITIS WITHOUT HEMORRHAGE: ICD-10-CM

## 2025-06-04 DIAGNOSIS — K64.8 INTERNAL HEMORRHOID: ICD-10-CM

## 2025-06-04 DIAGNOSIS — K86.2 PANCREATIC CYST: ICD-10-CM

## 2025-06-04 DIAGNOSIS — R19.5 DARK STOOLS: ICD-10-CM

## 2025-06-04 DIAGNOSIS — K57.90 DIVERTICULOSIS: ICD-10-CM

## 2025-06-04 DIAGNOSIS — K59.04 CHRONIC IDIOPATHIC CONSTIPATION: ICD-10-CM

## 2025-06-04 DIAGNOSIS — D13.2 DUODENAL ADENOMA: ICD-10-CM

## 2025-06-04 DIAGNOSIS — Z86.0100 HISTORY OF COLON POLYPS: ICD-10-CM

## 2025-06-04 DIAGNOSIS — K62.5 RECTAL BLEEDING: ICD-10-CM

## 2025-06-04 PROCEDURE — 99214 OFFICE O/P EST MOD 30 MIN: CPT

## 2025-06-04 RX ORDER — SERTRALINE 100 MG/1
TAKE 1 TABLET (100 MG) BY ORAL ROUTE ONCE DAILY TABLET, FILM COATED ORAL 1
Qty: 0 | Refills: 0 | Status: DISCONTINUED | COMMUNITY
Start: 1900-01-01

## 2025-06-04 RX ORDER — DULOXETINE 30 MG/1
1 CAPSULE CAPSULE, DELAYED RELEASE PELLETS ORAL ONCE A DAY
Status: ACTIVE | COMMUNITY

## 2025-06-04 RX ORDER — FLUTICASONE FUROATE AND VILANTEROL TRIFENATATE 100; 25 UG/1; UG/1
INHALE 1 PUFF BY INHALATION ROUTE ONCE DAILY AT THE SAME TIME EACH DAY POWDER RESPIRATORY (INHALATION) 1
Qty: 1 | Refills: 0 | Status: ACTIVE | COMMUNITY
Start: 1900-01-01

## 2025-06-04 RX ORDER — PANTOPRAZOLE SODIUM 40 MG/1
1 TABLET TABLET, DELAYED RELEASE ORAL ONCE A DAY
Qty: 90 TABLET | Refills: 1 | Status: ACTIVE | COMMUNITY

## 2025-06-04 RX ORDER — FAMOTIDINE 40 MG/1
1 TABLET AT BEDTIME TABLET, FILM COATED ORAL ONCE A DAY
Qty: 90 TABLET | Refills: 1 | Status: ACTIVE | COMMUNITY

## 2025-06-04 RX ORDER — LISINOPRIL 10 MG/1
TAKE 1 TABLET (10 MG) BY ORAL ROUTE ONCE DAILY TABLET ORAL 1
Qty: 0 | Refills: 0 | Status: DISCONTINUED | COMMUNITY
Start: 1900-01-01

## 2025-06-04 NOTE — HPI-TODAY'S VISIT:
59-year-old female who now presents for follow-up.   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  She underwent EGD 03/10/2020 that demonstrated a 3 cm HH and non-erosive gastritis.  Biopsy of the esophagus showed reflux changes.  Biopsies demonstrated gastritis with possible H.pylori gastritis.  There was focal thickening in the 2nd portion of the duodenum with irregularity which biopsies revealed tubular adenoma without dysplasia. She underwent  EGD/EUS on 05/18/2020 that again demonstrated a 2 cm HH and reactive gastropathy w/o HP, +HP and Fundic gland polyps. There was a 2.2 cm sessile polyp in the 2nd portion of the duodenum distal to the ampulla with carpet like extension.  There was a nodular mucosa adjacent to the papilla which biopsies revealed prominent Brunner's gland.  Diffuse echogenicity of the pancreas was also noted.  She had a repeat procedure on 08/03/2020 and had endoscopic mucosal resection of a 2.5 cm adenoma which the resection was incomplete.  She underwent surgical resection by Dr. Josué Melendez.  She had repeat procedure on 11/23/2020 that showed a patent anastomosis in the 2nd portion duodenum with residual 2 mm duodenal polyp at the anastomosis and the biopsies showed adenomatous changes. EGD 7/21/22: 2cm HH, mild chronic gastritis-no h pylori, two 4 to 12mm sessile duodenal polyps-tubular adenomas. Repeat in 6 months. EGD 8/15/24: single duodeal polyp bx showed duodenal adenoma with low grade dysphagsia, bx of esophagus was normal no EOE or IM. After her procedure she started to have abd pain n/v. CT was ordered which showed hepatomegaly ith fatty infiltration, 7.9 mm nonspecifid noncalicifed nodule in the left lower lobe f/u in 3 months, s/p hysterectomy. She will need a repeat EGD in 1-2 months with possible APC.   After last visit patient had a endoscopy on 10-4-2024 which demonstrated 3 cm hiatal hernia, scar consistent with prior polypectomy few flat polyps were around the second portion of the duodenum coagulation was done no biopsies were done. After the procedure patient developed severe abdominal pain with fever, nausea and vomiting. She went to [facility] ER and CT demonstrated stranding the third portion of the duodenum with a 3 cm fluid collection without free air consistent with contained perforation she was then transferred to [facility]. She had a CT scan 10/5 at [facility] that demonstrated duodenal perforation likely at the junction between the second and third portions of duodenum small amount of fluid, contrast and gas. She was placed on tpn and NPO. She was discharged on antibiotics on full liquid diet. She presented to hepatobiliary surgery on 10- They noted no role for empiric resection at this point. Plan is to advance diet and imaging in 3 months and EGD for surveillance and biopsy only in 6 months would avoid any further attempts at endoscopic intervention. If recurrent dysplasia will need to discuss duodenal removal or Whipple procedure. She has been MRI scheduled for January/February 2025 and they will follow-up with her then.   She had her MRI on 1- which demonstrated resolution of enhancing tract between the cecum and duodenum, improved biliary ductal dilation, hepatomegaly and hepatic steatosis, stable left lower pulm nodule measuring 0.7 cm, cystic lesion pancreatic body 0.4 cm recommend MRI in 1 year. She had her endoscopy 5 - 8 - 25 which demonstrated hiatal hernia, nonerosive gastritis, duodenal scar with residual nodular mucosa biopsy showed chronic duodenitis no dysplasia was seen. Overall patient is doing better she is currently on pantoprazole and Pepcid which controls her reflux symptoms. She is not having any abdominal pain, nausea or vomiting. She is constipated and has been off Linzess since last visit. She does have the prescription however has not started back on medication. Denies any hematochezia or melena. She did have 1 internal hemorrhoid banding before sent to the hospital which helped her rectal bleeding. Her last colonoscopy was 8- which demonstrated fair prep, internal/external hemorrhoids, diverticulosis, 2 HP and 1 TA recall in 3 years